# Patient Record
Sex: FEMALE | Race: WHITE | NOT HISPANIC OR LATINO | Employment: PART TIME | URBAN - METROPOLITAN AREA
[De-identification: names, ages, dates, MRNs, and addresses within clinical notes are randomized per-mention and may not be internally consistent; named-entity substitution may affect disease eponyms.]

---

## 2017-08-02 ENCOUNTER — HISTORICAL (OUTPATIENT)
Dept: RADIOLOGY | Facility: HOSPITAL | Age: 27
End: 2017-08-02

## 2021-02-08 LAB
PAP RECOMMENDATION EXT: NORMAL
PAP SMEAR: NORMAL

## 2022-04-11 ENCOUNTER — HISTORICAL (OUTPATIENT)
Dept: ADMINISTRATIVE | Facility: HOSPITAL | Age: 32
End: 2022-04-11

## 2022-04-27 VITALS
HEIGHT: 63 IN | SYSTOLIC BLOOD PRESSURE: 187 MMHG | BODY MASS INDEX: 51.91 KG/M2 | WEIGHT: 293 LBS | DIASTOLIC BLOOD PRESSURE: 113 MMHG

## 2023-02-06 ENCOUNTER — DOCUMENTATION ONLY (OUTPATIENT)
Dept: ADMINISTRATIVE | Facility: HOSPITAL | Age: 33
End: 2023-02-06

## 2023-03-24 DIAGNOSIS — M54.30 SCIATICA, UNSPECIFIED LATERALITY: Primary | ICD-10-CM

## 2023-03-24 DIAGNOSIS — R52 WHOLE BODY PAIN: ICD-10-CM

## 2023-08-29 DIAGNOSIS — R51.9 FREQUENT HEADACHES: Primary | ICD-10-CM

## 2023-11-09 ENCOUNTER — HOSPITAL ENCOUNTER (OUTPATIENT)
Dept: RADIOLOGY | Facility: HOSPITAL | Age: 33
Discharge: HOME | End: 2023-11-09
Attending: INTERNAL MEDICINE
Payer: MEDICAID

## 2023-11-09 ENCOUNTER — OFFICE VISIT (OUTPATIENT)
Dept: RHEUMATOLOGY | Facility: CLINIC | Age: 33
End: 2023-11-09
Payer: MEDICAID

## 2023-11-09 VITALS
WEIGHT: 293 LBS | BODY MASS INDEX: 53.92 KG/M2 | TEMPERATURE: 98 F | SYSTOLIC BLOOD PRESSURE: 160 MMHG | HEART RATE: 93 BPM | HEIGHT: 62 IN | DIASTOLIC BLOOD PRESSURE: 86 MMHG | RESPIRATION RATE: 20 BRPM | OXYGEN SATURATION: 98 %

## 2023-11-09 DIAGNOSIS — M79.671 CHRONIC PAIN OF BOTH FEET: ICD-10-CM

## 2023-11-09 DIAGNOSIS — I10 PRIMARY HYPERTENSION: ICD-10-CM

## 2023-11-09 DIAGNOSIS — M79.7 FIBROMYALGIA: Primary | ICD-10-CM

## 2023-11-09 DIAGNOSIS — M25.511 CHRONIC PAIN OF BOTH SHOULDERS: ICD-10-CM

## 2023-11-09 DIAGNOSIS — M25.512 CHRONIC PAIN OF BOTH SHOULDERS: ICD-10-CM

## 2023-11-09 DIAGNOSIS — G89.29 CHRONIC PAIN OF BOTH FEET: ICD-10-CM

## 2023-11-09 DIAGNOSIS — M79.641 PAIN OF RIGHT HAND: ICD-10-CM

## 2023-11-09 DIAGNOSIS — G89.29 CHRONIC PAIN OF BOTH SHOULDERS: ICD-10-CM

## 2023-11-09 DIAGNOSIS — M79.672 CHRONIC PAIN OF BOTH FEET: ICD-10-CM

## 2023-11-09 DIAGNOSIS — G56.01 CARPAL TUNNEL SYNDROME OF RIGHT WRIST: ICD-10-CM

## 2023-11-09 DIAGNOSIS — G43.919 INTRACTABLE MIGRAINE WITHOUT STATUS MIGRAINOSUS, UNSPECIFIED MIGRAINE TYPE: ICD-10-CM

## 2023-11-09 DIAGNOSIS — F17.200 SMOKING: ICD-10-CM

## 2023-11-09 DIAGNOSIS — M79.7 FIBROMYALGIA: ICD-10-CM

## 2023-11-09 DIAGNOSIS — J45.909 ASTHMA, UNSPECIFIED ASTHMA SEVERITY, UNSPECIFIED WHETHER COMPLICATED, UNSPECIFIED WHETHER PERSISTENT: ICD-10-CM

## 2023-11-09 DIAGNOSIS — K44.9 HIATAL HERNIA: ICD-10-CM

## 2023-11-09 DIAGNOSIS — E66.01 CLASS 3 SEVERE OBESITY DUE TO EXCESS CALORIES WITH BODY MASS INDEX (BMI) OF 60.0 TO 69.9 IN ADULT, UNSPECIFIED WHETHER SERIOUS COMORBIDITY PRESENT: ICD-10-CM

## 2023-11-09 DIAGNOSIS — G62.9 NEUROPATHY: ICD-10-CM

## 2023-11-09 DIAGNOSIS — M54.30 SCIATICA, UNSPECIFIED LATERALITY: ICD-10-CM

## 2023-11-09 PROBLEM — E66.813 CLASS 3 SEVERE OBESITY DUE TO EXCESS CALORIES WITH BODY MASS INDEX (BMI) OF 60.0 TO 69.9 IN ADULT: Status: ACTIVE | Noted: 2023-11-09

## 2023-11-09 PROCEDURE — 73130 X-RAY EXAM OF HAND: CPT | Mod: TC,LT

## 2023-11-09 PROCEDURE — 3008F BODY MASS INDEX DOCD: CPT | Mod: CPTII,,, | Performed by: INTERNAL MEDICINE

## 2023-11-09 PROCEDURE — 4010F ACE/ARB THERAPY RXD/TAKEN: CPT | Mod: CPTII,,, | Performed by: INTERNAL MEDICINE

## 2023-11-09 PROCEDURE — 73630 X-RAY EXAM OF FOOT: CPT | Mod: TC,RT

## 2023-11-09 PROCEDURE — 3079F DIAST BP 80-89 MM HG: CPT | Mod: CPTII,,, | Performed by: INTERNAL MEDICINE

## 2023-11-09 PROCEDURE — 72114 X-RAY EXAM L-S SPINE BENDING: CPT | Mod: TC

## 2023-11-09 PROCEDURE — 99205 PR OFFICE/OUTPT VISIT, NEW, LEVL V, 60-74 MIN: ICD-10-PCS | Mod: S$PBB,,, | Performed by: INTERNAL MEDICINE

## 2023-11-09 PROCEDURE — 73630 X-RAY EXAM OF FOOT: CPT | Mod: TC,LT

## 2023-11-09 PROCEDURE — 3008F PR BODY MASS INDEX (BMI) DOCUMENTED: ICD-10-PCS | Mod: CPTII,,, | Performed by: INTERNAL MEDICINE

## 2023-11-09 PROCEDURE — 99205 OFFICE O/P NEW HI 60 MIN: CPT | Mod: S$PBB,,, | Performed by: INTERNAL MEDICINE

## 2023-11-09 PROCEDURE — 73030 X-RAY EXAM OF SHOULDER: CPT | Mod: TC,RT

## 2023-11-09 PROCEDURE — 73130 X-RAY EXAM OF HAND: CPT | Mod: TC,RT

## 2023-11-09 PROCEDURE — 3077F PR MOST RECENT SYSTOLIC BLOOD PRESSURE >= 140 MM HG: ICD-10-PCS | Mod: CPTII,,, | Performed by: INTERNAL MEDICINE

## 2023-11-09 PROCEDURE — 99215 OFFICE O/P EST HI 40 MIN: CPT | Mod: PBBFAC | Performed by: INTERNAL MEDICINE

## 2023-11-09 PROCEDURE — 73030 X-RAY EXAM OF SHOULDER: CPT | Mod: TC,LT

## 2023-11-09 PROCEDURE — 3077F SYST BP >= 140 MM HG: CPT | Mod: CPTII,,, | Performed by: INTERNAL MEDICINE

## 2023-11-09 PROCEDURE — 1159F PR MEDICATION LIST DOCUMENTED IN MEDICAL RECORD: ICD-10-PCS | Mod: CPTII,,, | Performed by: INTERNAL MEDICINE

## 2023-11-09 PROCEDURE — 3079F PR MOST RECENT DIASTOLIC BLOOD PRESSURE 80-89 MM HG: ICD-10-PCS | Mod: CPTII,,, | Performed by: INTERNAL MEDICINE

## 2023-11-09 PROCEDURE — 4010F PR ACE/ARB THEARPY RXD/TAKEN: ICD-10-PCS | Mod: CPTII,,, | Performed by: INTERNAL MEDICINE

## 2023-11-09 PROCEDURE — 1159F MED LIST DOCD IN RCRD: CPT | Mod: CPTII,,, | Performed by: INTERNAL MEDICINE

## 2023-11-09 RX ORDER — TIZANIDINE 4 MG/1
4 TABLET ORAL NIGHTLY
COMMUNITY
Start: 2023-10-16

## 2023-11-09 RX ORDER — LEVOCETIRIZINE DIHYDROCHLORIDE 5 MG/1
5 TABLET, FILM COATED ORAL DAILY
COMMUNITY
Start: 2023-10-16

## 2023-11-09 RX ORDER — IBUPROFEN 800 MG/1
800-1600 TABLET ORAL
COMMUNITY
Start: 2023-10-18

## 2023-11-09 RX ORDER — ASPIRIN 325 MG
50000 TABLET, DELAYED RELEASE (ENTERIC COATED) ORAL
COMMUNITY
Start: 2023-10-16

## 2023-11-09 RX ORDER — BUTALBITAL, ACETAMINOPHEN AND CAFFEINE 300; 40; 50 MG/1; MG/1; MG/1
1 CAPSULE ORAL
COMMUNITY
Start: 2023-08-04 | End: 2024-02-08

## 2023-11-09 RX ORDER — VALACYCLOVIR HYDROCHLORIDE 1 G/1
1000 TABLET, FILM COATED ORAL 2 TIMES DAILY PRN
COMMUNITY
Start: 2023-11-07

## 2023-11-09 RX ORDER — SUMATRIPTAN SUCCINATE 25 MG/1
25 TABLET ORAL DAILY PRN
COMMUNITY
Start: 2023-10-05 | End: 2024-02-08

## 2023-11-09 RX ORDER — ENALAPRIL MALEATE AND HYDROCHLOROTHIAZIDE 10; 25 MG/1; MG/1
1 TABLET ORAL DAILY
COMMUNITY
Start: 2023-10-16

## 2023-11-09 RX ORDER — LISINOPRIL 10 MG/1
10 TABLET ORAL DAILY
COMMUNITY
Start: 2023-11-06

## 2023-11-09 RX ORDER — GABAPENTIN 300 MG/1
300 CAPSULE ORAL 3 TIMES DAILY
COMMUNITY
Start: 2023-10-24

## 2023-11-09 RX ORDER — OXYBUTYNIN CHLORIDE 5 MG/1
5 TABLET ORAL DAILY
COMMUNITY
Start: 2023-10-16

## 2023-11-09 RX ORDER — HYDRALAZINE HYDROCHLORIDE 25 MG/1
25 TABLET, FILM COATED ORAL EVERY 8 HOURS PRN
COMMUNITY
Start: 2023-10-16

## 2023-11-09 RX ORDER — HYDROCHLOROTHIAZIDE 25 MG/1
25 TABLET ORAL DAILY
COMMUNITY
Start: 2023-11-06

## 2023-11-09 RX ORDER — HYDROXYCHLOROQUINE SULFATE 200 MG/1
200 TABLET, FILM COATED ORAL 2 TIMES DAILY
COMMUNITY
Start: 2023-10-16 | End: 2023-11-14 | Stop reason: ALTCHOICE

## 2023-11-09 RX ORDER — NORGESTIMATE AND ETHINYL ESTRADIOL 7DAYSX3 LO
1 KIT ORAL DAILY
COMMUNITY
Start: 2023-10-16

## 2023-11-09 RX ORDER — DILTIAZEM HYDROCHLORIDE 240 MG/1
240 CAPSULE, EXTENDED RELEASE ORAL DAILY
COMMUNITY
Start: 2023-10-28

## 2023-11-09 RX ORDER — TRAZODONE HYDROCHLORIDE 50 MG/1
50 TABLET ORAL NIGHTLY
COMMUNITY
Start: 2023-10-16

## 2023-11-09 NOTE — PROGRESS NOTES
Patient ID: [id number]     Chief Complaint: Pain (Right hip, right side back, both shoulders, right hand, right knee, left foot. States shocking pain. Right side of body gets stiff and causes the pain to worsen.)      Referred By: Paper Order     HPI:     Mackenzie Church is a 33 y.o. female here today for a new patient visit.      Patient was previously being seen by Dr. Braxton as her PCP but switched providers. She reports having history of pain primarily from her right shoulder down to her knees on the right side of her body. She also reports having pain in her left shoulder and left foot. She was previously prescribed Plaquenil 200 mg BID which she has been taking for over a year though she has not noted any relief with treatment. Patient does reports history of Raynaud's phenomenon. She reports previous extensive workup of her symptoms but was not given a definitive diagnosis. Of note, patient does report history of poor sleep/insomnia with difficulty falling asleep. Also reports not feeling well rested when she awakens from sleep even if she get 7-8 hours of rest. Patient does report working on trying to improve her health and has started going to the gym with reported 140 lb weight loss.     She saw Dr Braxton in the past. Now seeing Ms Cirilo Lawson.       Denies history of fevers, rashes, photosensitivity, oral or nasal ulcers, h/o MI, stroke, seizures, h/o PE or DVT, uveitis, malignancies.     Family history of autoimmune disease: Lupus in her sister and mother; Fibromyalgia in maternal aunts      Smoking:   Social History     Tobacco Use   Smoking Status Every Day    Current packs/day: 0.50    Average packs/day: 0.5 packs/day for 20.0 years (10.0 ttl pk-yrs)    Types: Cigarettes   Smokeless Tobacco Never   Tobacco Comments    Working on quitting, went from 2 packs daily to 1/2 pack daily          ----------------------------  Carpal tunnel syndrome  Endometriosis, unspecified  Hiatal hernia  HTN  (hypertension)  Legal blindness  Migraine  Neuropathy  PCOS (polycystic ovarian syndrome)  Sciatica     Past Surgical History:   Procedure Laterality Date    REATTACHMENT OF FINGER Right     second and third digits    TONSILLECTOMY, ADENOIDECTOMY         Review of patient's allergies indicates:   Allergen Reactions    Robaxin [methocarbamol] Itching and Nausea And Vomiting       Outpatient Medications Marked as Taking for the 11/9/23 encounter (Office Visit) with Jl Damon MD   Medication Sig Dispense Refill    butalbital-acetaminophen-caff -40 mg Cap Take 1 capsule by mouth every 6 to 8 hours as needed.      cholecalciferol, vitamin D3, 1,250 mcg (50,000 unit) capsule Take 50,000 Units by mouth every 7 days.      enalapriL-hydrochlorothiazide (VASERETIC) 10-25 mg per tablet Take 1 tablet by mouth once daily.      gabapentin (NEURONTIN) 300 MG capsule Take 300 mg by mouth 3 (three) times daily.      hydrALAZINE (APRESOLINE) 25 MG tablet Take 25 mg by mouth every 8 (eight) hours as needed.      hydroCHLOROthiazide (HYDRODIURIL) 25 MG tablet Take 25 mg by mouth once daily.      hydroxychloroquine (PLAQUENIL) 200 mg tablet Take 200 mg by mouth 2 (two) times daily.      ibuprofen (ADVIL,MOTRIN) 800 MG tablet Take 800-1,600 mg by mouth every 4 to 6 hours as needed.      levocetirizine (XYZAL) 5 MG tablet Take 5 mg by mouth once daily.      lisinopriL 10 MG tablet Take 10 mg by mouth once daily.      oxybutynin (DITROPAN) 5 MG Tab Take 5 mg by mouth once daily.      sumatriptan (IMITREX) 25 MG Tab Take 25 mg by mouth daily as needed.      TAZTIA  mg Cs24 Take 240 mg by mouth once daily.      tiZANidine (ZANAFLEX) 4 MG tablet Take 4 mg by mouth every evening.      traZODone (DESYREL) 50 MG tablet Take 50 mg by mouth every evening.      TRI-LO-ESTARYLLA 0.18/0.215/0.25 mg-25 mcg tablet Take 1 tablet by mouth once daily.      valACYclovir (VALTREX) 1000 MG tablet Take 1,000 mg by mouth 2 (two) times daily  as needed.         Social History     Socioeconomic History    Marital status: Single   Tobacco Use    Smoking status: Every Day     Current packs/day: 0.50     Average packs/day: 0.5 packs/day for 20.0 years (10.0 ttl pk-yrs)     Types: Cigarettes    Smokeless tobacco: Never    Tobacco comments:     Working on quitting, went from 2 packs daily to 1/2 pack daily   Substance and Sexual Activity    Alcohol use: Not Currently     Comment: occ    Drug use: Never        Family History   Problem Relation Age of Onset    Fibromyalgia Mother     Lupus Mother     HIV Mother     Bipolar disorder Mother     Lung cancer Father     Lupus Sister     Graves' disease Brother     Prostate cancer Paternal Uncle     Stomach cancer Paternal Grandmother         Immunization History   Administered Date(s) Administered    Tdap 02/23/2021       Patient Care Team:  Sue Cantu FNP as PCP - General (Family Medicine)     Subjective:     Constitutional:  Denies chills. Denies fever. Denies night sweats. Denies weight loss.   Ophthalmology: Denies blurred vision. Denies dry eyes. Denies eye pain. Denies Itching and redness.   ENT: Denies oral ulcers. Denies epistaxis. Denies dry mouth. Denies swollen glands.   Endocrine: Denies diabetes. Denies thyroid Problems.   Respiratory: Denies cough. Denies shortness of breath. Denies shortness of breath with exertion. Denies hemoptysis.   Cardiovascular: Denies chest pain at rest. Denies chest pain with exertion. Denies palpitations.    Gastrointestinal: Denies abdominal pain. Denies diarrhea. Denies nausea. Denies vomiting. Denies hematemesis or hematochezia. Denies heartburn.  Genitourinary: Denies blood in urine.  Musculoskeletal: See HPI for details  Integumentary: Denies rash. Denies photosensitivity.   Peripheral Vascular: Denies Ulcers of hands and/or feet. Denies Cold extremities.   Neurologic: Denies dizziness. Denies headache.  Denies loss of strength. Denies numbness or tingling.  "  Psychiatric: Denies depression. Denies anxiety. Denies suicidal/homicidal ideations.      Objective:     BP (!) 160/86 (BP Location: Left forearm, Patient Position: Sitting, BP Method: Medium (Manual))   Pulse 93   Temp 97.9 °F (36.6 °C) (Oral)   Resp 20   Ht 5' 2" (1.575 m)   Wt (!) 158.8 kg (350 lb 3.2 oz)   LMP 10/05/2023 (Exact Date)   SpO2 98%   BMI 64.05 kg/m²     Physical Exam    General Appearance: alert, pleasant, in no acute distress.  Skin: Skin color, texture, turgor normal. Small oral lesion noted in right upper lip. no lesions inside oral cavity  Eyes:  extraocular movement intact (EOMI), pupils equal, round, reactive to light and accommodation, conjunctiva clear.  ENT: No oral or nasal ulcers.  Neck:  Neck supple. No adenopathy.   Lungs: CTA throughout without crackles, rhonchi, or wheezes.   Heart: RRR w/o murmurs.  No edema. 2+ DP/TP pulse. Normal capillary refill  Abdomen: Soft, non-tender, no masses, rebound or guarding.  Neuro: Alert, oriented, CN II-XII GI, sensory and motor innervation intact.  Musculoskeletal: Tender points noted diffusely/bilaterally   Psych: Alert, oriented, normal eye contact.    Joint Exam:  DIPs, PIPs, MCPs: pain and swelling noted in 2nd MCP joint of right hand, no tenderness or swelling of other MCP's   Wrists: no pain on palpation, no swelling or redness, good range of motion.  Elbows: no pain on palpation, no swelling or redness, good range of motion, no nodules.  Shoulders: no pain on palpation, no swelling or redness, good range of motion.  Back/Neck: no pain on palpation.   Hips: unable to examine due to body habitus  Knees: unable to examine due to body habitus  Ankles: no pain on palpation, no swelling or redness, good range of motion.  Feet: no pain on palpation, no swelling or redness, no nodules. Flat feet noted bilaterally    Labs:   Unable to access previous records    Imaging:   Unable to access previous records    Assessment:       ICD-10-CM " ICD-9-CM   1. Fibromyalgia  M79.7 729.1   2. Chronic pain of both feet  M79.671 729.5    G89.29 338.29    M79.672    3. Chronic pain of both shoulders  M25.511 719.41    G89.29 338.29    M25.512    4. Pain of right hand  M79.641 729.5   5. Primary hypertension  I10 401.9   6. Sciatica, unspecified laterality  M54.30 724.3   7. Class 3 severe obesity due to excess calories with body mass index (BMI) of 60.0 to 69.9 in adult, unspecified whether serious comorbidity present  E66.01 278.01    Z68.44 V85.44   8. Intractable migraine without status migrainosus, unspecified migraine type  G43.919 346.91   9. Asthma, unspecified asthma severity, unspecified whether complicated, unspecified whether persistent  J45.909 493.90   10. Hiatal hernia  K44.9 553.3   11. Carpal tunnel syndrome of right wrist  G56.01 354.0   12. Neuropathy  G62.9 355.9   13. Smoking  F17.200 305.1        Plan:     Fibromyalgia:  Discussed clinical features of fibromyalgia in detail.  Fibromyalgia is a chronic pain syndrome.  Underlying causes of fibromyalgia may be numerous.  An underlying nonrestorative sleep pattern, mental and physical stressors play a role in pain perception.    Discussed relationship of pain with sleep.  The brain functions best with a normal restful sleep that includes REM sleep.  Discussed that a nonrestorative sleep pattern may cause a decrease in pain tolerance.  Discussed that over time, this builds up and causes a cycling effect on pain.  This pain is not easily curable with pain medications or narcotics.  It is important to decrease stress levels and improving sleep patterns/hygiene.  A restorative sleep pattern is important in improving the perception of pain.    Medications for fibromyalgia only help 10-20% of the time and come with multiple side effects. FDA recommended medications for fibromyalgia include: lyrica, cymbalta, and savella.  Other medications which have been used in fibromyalgia include amitriptyline,  gabapentin, flexeril, gabapentin, and low dose naltresone.     Exercise and a healthy life-style is important in management of this syndrome. We discussed this at length and I have recommended regular low impact exercise, preferably aquatic therapy, as well as cognitive/ behavioral therapy.    - Recommend having sleep study done.   - Will order screening labs today to assess; CBC, CMP, RANDAL, CCP, RF   - Will order X-rays of hands, wrists, shoulders, feet, and lumbar spine   - Currently taking Plaquenil 200 mg BID; will make changes once tests/imaging return   - Discussed about watching her diet and weight loss.  - Flat feet bilaterally, body habitus and flat feet contributing to ankle pain.     Suspect component of JOSE   - Recommend patient talk to PCP about scheduling sleep study as component of patient's symptoms    Raynaud's Phenomenon   - Previously diagnosed with Raynaud's by previous PCP. Discussed symptomatic management.    - Asked patient to take images of skin color changes of her hands and bring to next appointment    Tobacco use   - Reports previously smoking 2 ppd and recently cut down to 0.5 ppd   - Encourage continued smoking cessation       Follow up in about 3 months (around 2/9/2024). In addition to their scheduled follow up, the patient has also been instructed to follow up on as needed basis.        Total time spent with patient and documentation is more than 60 minutes. All questions were answered to patient's satisfaction and patient verbalized understanding.     Feliciano Manuel MD  \[facility]\"" Internal Medicine, HO-2    Attending addendum to follow with appropriate changes made to documentation.

## 2023-11-14 ENCOUNTER — TELEPHONE (OUTPATIENT)
Dept: RHEUMATOLOGY | Facility: CLINIC | Age: 33
End: 2023-11-14
Payer: MEDICAID

## 2023-11-14 NOTE — TELEPHONE ENCOUNTER
Please let her know blood work for rheumatoid arthritis and lupus came back negative.    X-ray showed arthritis in spine, bilateral feet and bilateral shoulders.  Continue with stretches and exercises.  She can stop taking Plaquenil.  Recommend continuing exercises and going to the gym, continue to watch her weight and weight loss.  Recommend doing physical therapy to help with pain in bilateral shoulders and back.

## 2024-01-30 PROBLEM — F17.210 CIGARETTE NICOTINE DEPENDENCE WITHOUT COMPLICATION: Status: ACTIVE | Noted: 2024-01-30

## 2024-01-30 PROBLEM — M79.7 FIBROMYALGIA: Status: ACTIVE | Noted: 2024-01-30

## 2024-01-30 PROBLEM — I73.00 RAYNAUD'S PHENOMENON WITHOUT GANGRENE: Status: ACTIVE | Noted: 2024-01-30

## 2024-02-08 ENCOUNTER — OFFICE VISIT (OUTPATIENT)
Dept: NEUROLOGY | Facility: CLINIC | Age: 34
End: 2024-02-08
Payer: MEDICAID

## 2024-02-08 VITALS
SYSTOLIC BLOOD PRESSURE: 183 MMHG | BODY MASS INDEX: 53.92 KG/M2 | HEART RATE: 88 BPM | WEIGHT: 293 LBS | DIASTOLIC BLOOD PRESSURE: 84 MMHG | HEIGHT: 62 IN | OXYGEN SATURATION: 99 %

## 2024-02-08 DIAGNOSIS — R11.0 NAUSEA: ICD-10-CM

## 2024-02-08 DIAGNOSIS — R40.0 DAYTIME SOMNOLENCE: ICD-10-CM

## 2024-02-08 DIAGNOSIS — R51.9 FREQUENT HEADACHES: ICD-10-CM

## 2024-02-08 DIAGNOSIS — G43.111 MIGRAINE WITH AURA, INTRACTABLE, WITH STATUS MIGRAINOSUS: Primary | ICD-10-CM

## 2024-02-08 DIAGNOSIS — R06.83 SNORING: ICD-10-CM

## 2024-02-08 PROCEDURE — 99215 OFFICE O/P EST HI 40 MIN: CPT | Mod: PBBFAC | Performed by: NURSE PRACTITIONER

## 2024-02-08 PROCEDURE — 99215 OFFICE O/P EST HI 40 MIN: CPT | Mod: FQ,S$PBB,, | Performed by: NURSE PRACTITIONER

## 2024-02-08 PROCEDURE — 96372 THER/PROPH/DIAG INJ SC/IM: CPT | Mod: PBBFAC

## 2024-02-08 PROCEDURE — G2211 COMPLEX E/M VISIT ADD ON: HCPCS | Mod: S$PBB,,, | Performed by: NURSE PRACTITIONER

## 2024-02-08 PROCEDURE — 3079F DIAST BP 80-89 MM HG: CPT | Mod: CPTII,,, | Performed by: NURSE PRACTITIONER

## 2024-02-08 PROCEDURE — 3008F BODY MASS INDEX DOCD: CPT | Mod: CPTII,,, | Performed by: NURSE PRACTITIONER

## 2024-02-08 PROCEDURE — 4010F ACE/ARB THERAPY RXD/TAKEN: CPT | Mod: CPTII,,, | Performed by: NURSE PRACTITIONER

## 2024-02-08 PROCEDURE — 1159F MED LIST DOCD IN RCRD: CPT | Mod: CPTII,,, | Performed by: NURSE PRACTITIONER

## 2024-02-08 PROCEDURE — 1160F RVW MEDS BY RX/DR IN RCRD: CPT | Mod: CPTII,,, | Performed by: NURSE PRACTITIONER

## 2024-02-08 PROCEDURE — 3077F SYST BP >= 140 MM HG: CPT | Mod: CPTII,,, | Performed by: NURSE PRACTITIONER

## 2024-02-08 RX ORDER — METOCLOPRAMIDE 10 MG/1
10 TABLET ORAL 2 TIMES DAILY PRN
Qty: 30 TABLET | Refills: 2 | Status: SHIPPED | OUTPATIENT
Start: 2024-02-08

## 2024-02-08 RX ORDER — KETOROLAC TROMETHAMINE 30 MG/ML
30 INJECTION, SOLUTION INTRAMUSCULAR; INTRAVENOUS
Status: COMPLETED | OUTPATIENT
Start: 2024-02-08 | End: 2024-02-08

## 2024-02-08 RX ORDER — LANOLIN ALCOHOL/MO/W.PET/CERES
400 CREAM (GRAM) TOPICAL NIGHTLY
Qty: 30 TABLET | Refills: 6 | Status: SHIPPED | OUTPATIENT
Start: 2024-02-08

## 2024-02-08 RX ORDER — FERROUS SULFATE TAB 325 MG (65 MG ELEMENTAL FE) 325 (65 FE) MG
325 TAB ORAL DAILY
COMMUNITY
Start: 2024-01-13

## 2024-02-08 RX ADMIN — KETOROLAC TROMETHAMINE 30 MG: 30 INJECTION, SOLUTION INTRAMUSCULAR; INTRAVENOUS at 02:02
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
## 2024-02-09 ENCOUNTER — TELEPHONE (OUTPATIENT)
Dept: NEUROLOGY | Facility: CLINIC | Age: 34
End: 2024-02-09
Payer: MEDICAID

## 2024-02-09 NOTE — TELEPHONE ENCOUNTER
----- Message from KEILY Ludwig sent at 2/9/2024 11:44 AM CST -----  I sent in a prescription for Ajovy once a month. Her local pharmacy may not carry it. Please contact Pt and let her know. If her pharmacy does not carry it, please call it in to [facility], [facility] or [facility] of patient's choice.  ----- Message -----  From: Shae Umanzor MA  Sent: 2/9/2024  11:03 AM CST  To: KEILY Ludwig    Qulipta denied. Must try and fail formulary alternatives:    AJOVY AUTOINJECTOR 225 MG/1.5 AUTO INJCT   AJOVY SYRINGE 225 MG/1.5 SYRINGE   EMGALITY  MG/ML PEN INJCTR   EMGALITY SYRINGE 120 MG/ML SYRINGE   NURTEC ODT 75 MG TAB RAPDIS   UBRELVY 100 MG TABLET   UBRELVY 50 MG TABLET

## 2024-04-23 DIAGNOSIS — G43.111 MIGRAINE WITH AURA, INTRACTABLE, WITH STATUS MIGRAINOSUS: ICD-10-CM

## 2024-04-24 RX ORDER — UBROGEPANT 100 MG/1
TABLET ORAL
Qty: 10 TABLET | Refills: 2 | Status: SHIPPED | OUTPATIENT
Start: 2024-04-24 | End: 2024-05-14 | Stop reason: SDUPTHER

## 2024-05-09 NOTE — PROGRESS NOTES
Patient ID: [id number]     Chief Complaint: Fibromyalgia (Pt stated having pratima hip, rt knee and rt hand pain .Stated also rt hand numbness lost of feeling in legs)      HPI:     Mackenzie Church is a 33 y.o. female here today for a follow up visit.      Patient was previously being seen by Dr. Braxton as her PCP but switched providers. She reports having history of pain primarily from her right shoulder down to her knees on the right side of her body. She also reports having pain in her left shoulder and left foot. She was previously prescribed Plaquenil 200 mg BID which she has been taking for over a year though she has not noted any relief with treatment. Patient does reports history of Raynaud's phenomenon. She reports previous extensive workup of her symptoms but was not given a definitive diagnosis. Of note, patient does report history of poor sleep/insomnia with difficulty falling asleep. Also reports not feeling well rested when she awakens from sleep even if she get 7-8 hours of rest. Patient does report working on trying to improve her health and has started going to the gym with reported 140 lb weight loss.     She saw Dr Braxton in the past. Now seeing Ms Kerr EVE Lawson     May 2024: Here today for follow up. Since her last visit she was tested for sleep apnea and diagnosed with JOSE- she was started on CPAP but is having issues with the mask, waiting a call back from [facility]. She continues to work out 3-4 times a week at the gym, walking at least 30 miles a week- she is also on her feet and moving all day at work, works as a  at [employer]. She reports having red/warm/swollen joints to her hands and right knee. She reports her hands have really been bothering her, she states her Raynaud's has been bothering her, reports worse with cold, always trying to keep her hands warm, hands going numb at times- also suffers with CTS on right, uses night splints and does help, has had injections in the past  and was also beneficial but has not had in some time. She does report small sores on her finger tips that will last a week some days. She also reports she has been small blisters on her fingers now on her feet and she also reports happening in her hairline. She reports blisters are very painful. She has been feeling bad for the past year but feels symptoms are worsening. She also reports muscle weakness, she states she has been dropping objects, also occurring in her legs as well, initially thinking it was due to sciatica, she reports she has had falls in the past, feels weak and falls, she reports a tingling sensation then legs will give out.  She states weakness as been present for sometime. She will get occ red spots mainly to her forearms, otherwise no rashes, no itching/burning/etc, thought possibly eczema- comes and goes. She suffers with Migraines- recently had her meds adjusted and reports much better but still has occ headaches, currently has one today- follows with Neurology.     Denies any recent illness or hospitalizations since last visit.     Dr. Rodriguez- Cardio  Dr. Moore/Chayo Butt, NP- Neurology     Denies history of fevers, rashes, photosensitivity, oral or nasal ulcers, h/o MI, stroke, seizures, h/o PE or DVT, uveitis, malignancies.   Family history of autoimmune disease: Lupus in her sister and mother; Fibromyalgia in maternal aunts  Smoking: Start age 11 years, 1/2 ppd, hx 2 ppd x 10+ years, working on quitting- congratulated on efforts.     Social History     Tobacco Use   Smoking Status Former    Current packs/day: 0.50    Average packs/day: 0.5 packs/day for 20.0 years (10.0 ttl pk-yrs)    Types: Cigarettes   Smokeless Tobacco Never   Tobacco Comments    Working on quitting, went from 2 packs daily to 1/2 pack daily          -------------------------------------    Carpal tunnel syndrome    Endometriosis, unspecified    Hiatal hernia    HTN (hypertension)    Legal blindness    Migraine     Neuropathy    PCOS (polycystic ovarian syndrome)    Sciatica        Past Surgical History:   Procedure Laterality Date    REATTACHMENT OF FINGER Right     second and third digits    TONSILLECTOMY, ADENOIDECTOMY         Review of patient's allergies indicates:   Allergen Reactions    Robaxin [methocarbamol] Itching and Nausea And Vomiting       Outpatient Medications Marked as Taking for the 5/13/24 encounter (Office Visit) with Alexandria Tamayo FNP   Medication Sig Dispense Refill    cholecalciferol, vitamin D3, 1,250 mcg (50,000 unit) capsule Take 50,000 Units by mouth every 7 days.      FEROSUL 325 mg (65 mg iron) Tab tablet Take 325 mg by mouth once daily.      gabapentin (NEURONTIN) 300 MG capsule Take 300 mg by mouth 3 (three) times daily.      hydrALAZINE (APRESOLINE) 25 MG tablet Take 25 mg by mouth every 8 (eight) hours as needed.      levocetirizine (XYZAL) 5 MG tablet Take 5 mg by mouth once daily.      lisinopriL 10 MG tablet Take 10 mg by mouth once daily.      magnesium oxide (MAG-OX) 400 mg (241.3 mg magnesium) tablet Take 1 tablet (400 mg total) by mouth every evening. 30 tablet 6    metoclopramide HCl (REGLAN) 10 MG tablet Take 1 tablet (10 mg total) by mouth 2 (two) times daily as needed (nausea). 30 tablet 2    oxybutynin (DITROPAN) 5 MG Tab Take 5 mg by mouth once daily.      TAZTIA  mg Cs24 Take 240 mg by mouth once daily.      tiZANidine (ZANAFLEX) 4 MG tablet Take 4 mg by mouth every evening. Takes PRN      traZODone (DESYREL) 50 MG tablet Take 50 mg by mouth every evening.      TRI-LO-ESTARYLLA 0.18/0.215/0.25 mg-25 mcg tablet Take 1 tablet by mouth once daily.      UBRELVY 100 mg tablet TAKE ONE TABLET BY MOUTH TWICE DAILY AS NEEDED FOR MIGRAINE. IF symptoms persist OR return, may repeat dose AFTER TWO hours. max OF 200mg per 24 HOURS 10 tablet 2    valACYclovir (VALTREX) 1000 MG tablet Take 1,000 mg by mouth 2 (two) times daily as needed.       Current Facility-Administered  "Medications for the 5/13/24 encounter (Office Visit) with Alexandria Tamayo FNP   Medication Dose Route Frequency Provider Last Rate Last Admin    fremanezumab-vfrm 225 mg/1.5 mL autoinjector 225 mg  225 mg Subcutaneous 1 time in Clinic/[facility] Chayo Butt ANP           Social History     Socioeconomic History    Marital status: Single   Tobacco Use    Smoking status: Former     Current packs/day: 0.50     Average packs/day: 0.5 packs/day for 20.0 years (10.0 ttl pk-yrs)     Types: Cigarettes    Smokeless tobacco: Never    Tobacco comments:     Working on quitting, went from 2 packs daily to 1/2 pack daily   Substance and Sexual Activity    Alcohol use: Not Currently     Comment: occ    Drug use: Never    Sexual activity: Not Currently        Family History   Problem Relation Name Age of Onset    Fibromyalgia Mother      Lupus Mother      HIV Mother      Bipolar disorder Mother      Lung cancer Father      Lupus Sister      Graves' disease Brother      Prostate cancer Paternal Uncle      Stomach cancer Paternal Grandmother          Immunization History   Administered Date(s) Administered    Tdap 02/23/2021       Patient Care Team:  Sue Cantu FNP as PCP - General (Family Medicine)     Subjective:     Constitutional:  Denies chills. Denies fever. Denies night sweats. Admits weight loss, #5 since last visit- working out and dieting  Ophthalmology: Denies blurred vision. Denies dry eyes. Denies eye pain. Denies Itching and redness. Legally blind since the age of 13- states " deterioration" follows [facility]  ENT: Denies oral ulcers. Denies epistaxis. Denies dry mouth. Denies swollen glands.    Endocrine: Denies diabetes. Denies thyroid Problems.   Respiratory: Denies cough. Denies shortness of breath. Denies shortness of breath with exertion. Denies hemoptysis.   Cardiovascular: Denies chest pain at rest. Denies chest pain with exertion. Admits palpitations- stable, follows with " "Cardiology  Gastrointestinal: Denies abdominal pain. Denies diarrhea. Denies nausea. Denies vomiting. Denies hematemesis or hematochezia. Denies heartburn. Admits IBS-D  Genitourinary: Denies blood in urine.  Musculoskeletal: See HPI for details  Integumentary: Denies rash. Denies photosensitivity.   Peripheral Vascular: Denies Ulcers of hands and/or feet. Denies Cold extremities.   Neurologic: Denies dizziness. Admits headache.  Admits loss of strength. Admits numbness or tingling to hands and feet  Psychiatric: Admits depression and anxiety, reports was diagnosed with Bipolar I in the past but has not been on meds for over 15 years, overall has been stable. Denies suicidal/homicidal ideations.      Objective:     BP (!) 147/112 (BP Location: Left arm, Patient Position: Sitting, BP Method: Large (Manual))   Pulse 87   Temp 97.7 °F (36.5 °C) (Oral)   Resp 20   Ht 5' 3" (1.6 m)   Wt (!) 155.2 kg (342 lb 2.5 oz)   SpO2 96%   BMI 60.61 kg/m²     Physical Exam    General Appearance: alert, pleasant, in no acute distress.  Skin: Skin color, texture, turgor normal. No rashes noted  Eyes:  extraocular movement intact (EOMI), pupils equal, round, reactive to light and accommodation, conjunctiva clear.  ENT: No oral or nasal ulcers.  Neck:  Neck supple. No adenopathy.   Lungs: CTA throughout without crackles, rhonchi, or wheezes.   Heart: RRR w/o murmurs.  No edema. 2+ DP pulse. Normal capillary refill  Neuro: Alert, oriented, CN II-XII GI, sensory and motor innervation intact.  Musculoskeletal: Several tender points noted diffusely/bilaterally (12/18), puffy hands bilaterally, no active synovitis noted to bilateral MCPs/PIPs, FROM noted to bilateral wrists, elbows and shoulders with no pain, no pain to bilateral ankles or MTPs, knees with mild crepitus noted, muscle strength noted 5/5 to BU and BLE and neck flexion and extension   Psych: Alert, oriented, normal eye contact.       Labs:   11/9/23: CMP and CBC ok.  RANDAL " negative.  Anti CCP negative.  Rheumatoid factor negative.    Imagin/9/23:  Normal x-ray of bilateral hands.  X-ray of right foot showed punctate density along plantar aspect of interspace between the 1st and 2nd metatarsal may represent foreign body.  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  X-ray of lumbar spine showed DJD changes in posterior elements at L4-L5 and L5-S1, flexion and extension views were obtained with no evidence of instability.  X-ray of bilateral shoulders showed mild narrowing of AC and glenohumeral joints.    Assessment:       ICD-10-CM ICD-9-CM   1. Fibromyalgia  M79.7 729.1   2. Primary hypertension  I10 401.9   3. Cigarette nicotine dependence without complication  F17.210 305.1   4. Raynaud's phenomenon without gangrene  I73.00 443.0   5. Muscle weakness  M62.81 728.87   6. Carpal tunnel syndrome of right wrist  G56.01 354.0   7. Chronic pain of both feet  M79.671 729.5    G89.29 338.29    M79.672    8. Plantar fasciitis  M72.2 728.71   9. Chronic pain of right hip  M25.551 719.45    G89.29 338.29          Plan:     Fibromyalgia  - Discussed clinical features of fibromyalgia in detail again today as she continues to have several tender points noted on exam.  Fibromyalgia is a chronic pain syndrome.  Underlying causes of fibromyalgia may be numerous.  An underlying nonrestorative sleep pattern, mental and physical stressors play a role in pain perception.  - Discussed relationship of pain with sleep.  The brain functions best with a normal restful sleep that includes REM sleep.  Discussed that a nonrestorative sleep pattern may cause a decrease in pain tolerance.  Discussed that over time, this builds up and causes a cycling effect on pain.  This pain is not easily curable with pain medications or narcotics.  It is important to decrease stress levels and improving sleep patterns/hygiene.  A restorative sleep pattern is important in improving the perception of pain. She has  been diagnosed with JOSE since her last visit, awaiting a call back from sleep center for mask adjustment as she is having a difficult time using   - Medications for fibromyalgia only help 10-20% of the time and come with multiple side effects. FDA recommended medications for fibromyalgia include: lyrica, cymbalta, and savella.  Other medications which have been used in fibromyalgia include amitriptyline, gabapentin, flexeril, gabapentin, and low dose naltresone.   - Exercise and a healthy life-style is important in management of this syndrome. We discussed this at length and I have recommended regular low impact exercise, preferably aquatic therapy, as well as cognitive/ behavioral therapy.    - Continue watching  diet and weight loss.    JOSE   - Enc continued use with CPAP    Raynaud's Phenomenon   - Previously diagnosed with Raynaud's by previous PCP. Discussed symptomatic management. Keep hands warm.    - Asked patient to take images of skin color changes of her hands and bring to next appointment- enc to take pictures of sores that she notices on her finger tips as well- not noted on exam today- enc to take pictures and upload to portal     Nicotine Dependence    - Reports previously smoking 2 ppd and recently cut down to 0.5 ppd   - Encourage continued smoking cessation and congratulated her on her efforts as decreasing overall use! Keep working towards completed cessation     HTN  - Currently very elevated today, she reports she has a migraine today- did take meds as directed, continue to follow up with PCP, , encouraged her to monitor at home and if remains elevated follow-up with PCP, enc low Na+ diet.     Muscle weakness   - Strength noted 5/5 today to BU and BLE, will check Aldolase and CPK today for completeness- if continues to have issues with back and sciatica, enc to follow up with PCP for possible advised imaging, possible PT eval and treat     CTS- Right  - Reports previously diagnosed by PCP-  states first 3 digits will get numb and tingle- uses her hands daily as she is a , reports worse at night, she does use night splints and helps, was previously getting injections in the past and were beneficial- will refer to Ortho for eval/possible repeat injections    Chronic bilateral foot pain /plantar fasciitis   - Flat feet bilaterally, body habitus and flat feet contributing to ankle pain- referred to Podiatry today as she also suffers with plantar fasciitis in the past, was previously getting injections but has not had in a while and would like to see Podiatry   - Referral sent over to [facility]- enc to reach out if she does not hear from anyone in the next 2-3 weeks in regards to apt, enc supportive shoes, continue stretches  - November 2023: X-ray of right foot showed punctate density along plantar aspect of interspace between the 1st and 2nd metatarsal may represent foreign body- denies FB that she is aware of.  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     Chronic pain of right hip  - Xray bilateral hips today for completeness, unable to full assess hips due to body habitus  - Enc to continue exercise as tolerated along with diet for continued weight loss.     Follow up in about 6 months (around 11/13/2024) for NP Follow Up. In addition to their scheduled follow up, the patient has also been instructed to follow up on as needed basis.       Total time spent with patient and documentation is 70 minutes. All questions were answered to patient's satisfaction and patient verbalized understanding. This includes face to face time and non-face to face time preparing to see the patient (eg, review of tests), obtaining and/or reviewing separately obtained history, documenting clinical information in the electronic or other health record, independently interpreting results and communicating results to the patient/family/caregiver, or care coordinator. Today's visit included increased complexity  associated with the care of episodic problem Fibromyalgia/Pain addressed and managing the longitudinal care of the patient due to the series and or complex managed problem(s).   An additional 3 minutes were spent discussing tobacco cessation.       MARIA L Valdes

## 2024-05-13 ENCOUNTER — HOSPITAL ENCOUNTER (OUTPATIENT)
Dept: RADIOLOGY | Facility: HOSPITAL | Age: 34
Discharge: HOME | End: 2024-05-13
Attending: NURSE PRACTITIONER
Payer: MEDICAID

## 2024-05-13 ENCOUNTER — OFFICE VISIT (OUTPATIENT)
Dept: RHEUMATOLOGY | Facility: CLINIC | Age: 34
End: 2024-05-13
Payer: MEDICAID

## 2024-05-13 VITALS
HEIGHT: 63 IN | TEMPERATURE: 98 F | OXYGEN SATURATION: 96 % | RESPIRATION RATE: 20 BRPM | HEART RATE: 87 BPM | WEIGHT: 293 LBS | SYSTOLIC BLOOD PRESSURE: 147 MMHG | BODY MASS INDEX: 51.91 KG/M2 | DIASTOLIC BLOOD PRESSURE: 112 MMHG

## 2024-05-13 DIAGNOSIS — I10 PRIMARY HYPERTENSION: ICD-10-CM

## 2024-05-13 DIAGNOSIS — M72.2 PLANTAR FASCIITIS: ICD-10-CM

## 2024-05-13 DIAGNOSIS — M79.672 CHRONIC PAIN OF BOTH FEET: ICD-10-CM

## 2024-05-13 DIAGNOSIS — F17.210 CIGARETTE NICOTINE DEPENDENCE WITHOUT COMPLICATION: ICD-10-CM

## 2024-05-13 DIAGNOSIS — M62.81 MUSCLE WEAKNESS: ICD-10-CM

## 2024-05-13 DIAGNOSIS — G89.29 CHRONIC PAIN OF RIGHT HIP: ICD-10-CM

## 2024-05-13 DIAGNOSIS — I73.00 RAYNAUD'S PHENOMENON WITHOUT GANGRENE: ICD-10-CM

## 2024-05-13 DIAGNOSIS — M25.551 CHRONIC PAIN OF RIGHT HIP: ICD-10-CM

## 2024-05-13 DIAGNOSIS — M79.671 CHRONIC PAIN OF BOTH FEET: ICD-10-CM

## 2024-05-13 DIAGNOSIS — M79.7 FIBROMYALGIA: Primary | ICD-10-CM

## 2024-05-13 DIAGNOSIS — G89.29 CHRONIC PAIN OF BOTH FEET: ICD-10-CM

## 2024-05-13 DIAGNOSIS — G56.01 CARPAL TUNNEL SYNDROME OF RIGHT WRIST: ICD-10-CM

## 2024-05-13 PROBLEM — D64.9 ANEMIA: Status: ACTIVE | Noted: 2024-05-13

## 2024-05-13 PROBLEM — E55.9 VITAMIN D DEFICIENCY: Status: ACTIVE | Noted: 2024-05-13

## 2024-05-13 PROCEDURE — 4010F ACE/ARB THERAPY RXD/TAKEN: CPT | Mod: CPTII,,, | Performed by: NURSE PRACTITIONER

## 2024-05-13 PROCEDURE — 3080F DIAST BP >= 90 MM HG: CPT | Mod: CPTII,,, | Performed by: NURSE PRACTITIONER

## 2024-05-13 PROCEDURE — 1159F MED LIST DOCD IN RCRD: CPT | Mod: CPTII,,, | Performed by: NURSE PRACTITIONER

## 2024-05-13 PROCEDURE — 1160F RVW MEDS BY RX/DR IN RCRD: CPT | Mod: CPTII,,, | Performed by: NURSE PRACTITIONER

## 2024-05-13 PROCEDURE — 3077F SYST BP >= 140 MM HG: CPT | Mod: CPTII,,, | Performed by: NURSE PRACTITIONER

## 2024-05-13 PROCEDURE — 3008F BODY MASS INDEX DOCD: CPT | Mod: CPTII,,, | Performed by: NURSE PRACTITIONER

## 2024-05-13 PROCEDURE — 99215 OFFICE O/P EST HI 40 MIN: CPT | Mod: PBBFAC,25 | Performed by: NURSE PRACTITIONER

## 2024-05-13 PROCEDURE — 99406 BEHAV CHNG SMOKING 3-10 MIN: CPT | Mod: S$PBB,,, | Performed by: NURSE PRACTITIONER

## 2024-05-13 PROCEDURE — 73502 X-RAY EXAM HIP UNI 2-3 VIEWS: CPT | Mod: TC,RT

## 2024-05-13 PROCEDURE — 73502 X-RAY EXAM HIP UNI 2-3 VIEWS: CPT | Mod: TC,LT

## 2024-05-13 PROCEDURE — 99215 OFFICE O/P EST HI 40 MIN: CPT | Mod: S$PBB,25,, | Performed by: NURSE PRACTITIONER

## 2024-05-13 RX ORDER — IBUPROFEN 800 MG/1
800 TABLET ORAL 2 TIMES DAILY
Qty: 60 TABLET | Refills: 1 | Status: SHIPPED | OUTPATIENT
Start: 2024-05-13

## 2024-05-14 ENCOUNTER — PATIENT MESSAGE (OUTPATIENT)
Dept: NEUROLOGY | Facility: CLINIC | Age: 34
End: 2024-05-14

## 2024-05-14 ENCOUNTER — OFFICE VISIT (OUTPATIENT)
Dept: NEUROLOGY | Facility: CLINIC | Age: 34
End: 2024-05-14
Payer: MEDICAID

## 2024-05-14 DIAGNOSIS — G47.33 OSA ON CPAP: ICD-10-CM

## 2024-05-14 DIAGNOSIS — G43.111 MIGRAINE WITH AURA, INTRACTABLE, WITH STATUS MIGRAINOSUS: Primary | ICD-10-CM

## 2024-05-14 DIAGNOSIS — E66.01 CLASS 3 SEVERE OBESITY DUE TO EXCESS CALORIES WITH BODY MASS INDEX (BMI) OF 60.0 TO 69.9 IN ADULT, UNSPECIFIED WHETHER SERIOUS COMORBIDITY PRESENT: ICD-10-CM

## 2024-05-14 PROCEDURE — 99214 OFFICE O/P EST MOD 30 MIN: CPT | Mod: 95,,, | Performed by: NURSE PRACTITIONER

## 2024-05-14 PROCEDURE — 1160F RVW MEDS BY RX/DR IN RCRD: CPT | Mod: CPTII,95,, | Performed by: NURSE PRACTITIONER

## 2024-05-14 PROCEDURE — 1159F MED LIST DOCD IN RCRD: CPT | Mod: CPTII,95,, | Performed by: NURSE PRACTITIONER

## 2024-05-14 PROCEDURE — G2211 COMPLEX E/M VISIT ADD ON: HCPCS | Mod: 95,,, | Performed by: NURSE PRACTITIONER

## 2024-05-14 PROCEDURE — 4010F ACE/ARB THERAPY RXD/TAKEN: CPT | Mod: CPTII,95,, | Performed by: NURSE PRACTITIONER

## 2024-05-14 RX ORDER — UBROGEPANT 100 MG/1
100 TABLET ORAL 2 TIMES DAILY PRN
Qty: 10 TABLET | Refills: 2 | Status: SHIPPED | OUTPATIENT
Start: 2024-05-14

## 2024-05-14 RX ORDER — MULTIVITAMIN
1 TABLET ORAL DAILY
COMMUNITY

## 2024-05-14 RX ORDER — FREMANEZUMAB-VFRM 225 MG/1.5ML
225 INJECTION SUBCUTANEOUS
Qty: 1 EACH | Refills: 6 | Status: SHIPPED | OUTPATIENT
Start: 2024-05-14
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
## 2024-05-17 ENCOUNTER — TELEPHONE (OUTPATIENT)
Dept: RHEUMATOLOGY | Facility: CLINIC | Age: 34
End: 2024-05-17
Payer: MEDICAID

## 2024-05-17 NOTE — TELEPHONE ENCOUNTER
----- Message from MARIA L Valdes sent at 5/17/2024  9:27 AM CDT -----  Please advise the patient that all lab are noted to be clinically acceptable at this time, muscle enzymes noted to be normal, bilateral hip x-ray did show mild degenerative changes.  Please let her know she can consider PT to see if this helps with hip/back pain, otherwise continue to follow-up with PCP.  Referrals were sent to both Orthopedics and Podiatry, if she does not hear from either 1 of these in the next 2-3 weeks, please have a reach out to us so we can check on the status of the referral.  We will continue to monitor at future lab draws

## 2024-05-17 NOTE — TELEPHONE ENCOUNTER
Spoke to pt informed of message. Pt verbalized understanding. Pt  stated not interested in doing PT at this time because of her job situation.

## 2024-06-28 ENCOUNTER — TELEPHONE (OUTPATIENT)
Dept: NEUROLOGY | Facility: CLINIC | Age: 34
End: 2024-06-28
Payer: MEDICAID

## 2024-06-28 DIAGNOSIS — G43.111 MIGRAINE WITH AURA, INTRACTABLE, WITH STATUS MIGRAINOSUS: Primary | ICD-10-CM

## 2024-06-28 RX ORDER — DIVALPROEX SODIUM 250 MG/1
250 TABLET, DELAYED RELEASE ORAL EVERY 12 HOURS
Qty: 10 TABLET | Refills: 0 | Status: SHIPPED | OUTPATIENT
Start: 2024-06-28 | End: 2024-07-03

## 2024-06-28 RX ORDER — DEXAMETHASONE 4 MG/1
4 TABLET ORAL EVERY 12 HOURS
Qty: 10 TABLET | Refills: 0 | Status: SHIPPED | OUTPATIENT
Start: 2024-06-28 | End: 2024-07-03

## 2024-06-28 RX ORDER — KETOROLAC TROMETHAMINE 10 MG/1
10 TABLET, FILM COATED ORAL EVERY 6 HOURS
Qty: 20 TABLET | Refills: 0 | Status: SHIPPED | OUTPATIENT
Start: 2024-06-28 | End: 2024-07-03

## 2024-06-28 NOTE — TELEPHONE ENCOUNTER
----- Message from Amira Denis sent at 6/28/2024 11:47 AM CDT -----  Pt lvm @ 10:44 am requesting a call back about her migraines, pt can be reached @ [phone]

## 2024-06-28 NOTE — TELEPHONE ENCOUNTER
Please let Pt know to hold ibuprofen while taking ketorolac. Depakote and dexamethasone also called in

## 2024-06-28 NOTE — TELEPHONE ENCOUNTER
Patient reports migraine going on for 3 days, Ubrelvy not effective. Requesting migraine cocktail.

## 2024-09-09 PROBLEM — G43.111 MIGRAINE WITH AURA, INTRACTABLE, WITH STATUS MIGRAINOSUS: Chronic | Status: ACTIVE | Noted: 2023-11-09

## 2024-09-12 DIAGNOSIS — G43.111 MIGRAINE WITH AURA, INTRACTABLE, WITH STATUS MIGRAINOSUS: ICD-10-CM

## 2024-09-13 RX ORDER — UBROGEPANT 100 MG/1
TABLET ORAL
Qty: 10 TABLET | Refills: 0 | Status: SHIPPED | OUTPATIENT
Start: 2024-09-13

## 2024-09-18 ENCOUNTER — OFFICE VISIT (OUTPATIENT)
Dept: OBSTETRICS AND GYNECOLOGY | Facility: CLINIC | Age: 34
End: 2024-09-18
Payer: MEDICAID

## 2024-09-18 VITALS
HEART RATE: 86 BPM | BODY MASS INDEX: 62.35 KG/M2 | DIASTOLIC BLOOD PRESSURE: 70 MMHG | SYSTOLIC BLOOD PRESSURE: 130 MMHG | WEIGHT: 293 LBS

## 2024-09-18 DIAGNOSIS — Z01.419 ROUTINE GYNECOLOGICAL EXAMINATION: Primary | ICD-10-CM

## 2024-09-18 DIAGNOSIS — Z11.3 ENCOUNTER FOR SCREENING FOR INFECTIONS WITH A PREDOMINANTLY SEXUAL MODE OF TRANSMISSION: ICD-10-CM

## 2024-09-18 DIAGNOSIS — Z12.4 SCREENING FOR CERVICAL CANCER: ICD-10-CM

## 2024-09-18 DIAGNOSIS — N92.0 MENORRHAGIA WITH REGULAR CYCLE: ICD-10-CM

## 2024-09-18 LAB
ERYTHROCYTE [DISTWIDTH] IN BLOOD BY AUTOMATED COUNT: 13.4 % (ref 11–14.5)
HBV SURFACE AG SERPL QL IA: NEGATIVE
HBV SURFACE AG SERPL QL IA: NORMAL
HCT VFR BLD AUTO: 40.3 % (ref 36–48)
HGB BLD-MCNC: 13.4 G/DL (ref 11.8–16)
MCH RBC QN AUTO: 27.6 PG (ref 27–34)
MCHC RBC AUTO-ENTMCNC: 33.3 G/DL (ref 31–37)
MCV RBC AUTO: 83.1 FL (ref 79–99)
NRBC BLD AUTO-RTO: 0 %
PLATELET # BLD AUTO: 356 X10(3)/MCL (ref 140–371)
PMV BLD AUTO: 10.7 FL (ref 9.4–12.4)
RBC # BLD AUTO: 4.85 X10(6)/MCL (ref 4–5.1)
T4 FREE SERPL-MCNC: 0.79 NG/DL (ref 0.78–2.19)
TSH SERPL-ACNC: 2.31 UIU/ML (ref 0.36–3.74)
WBC # BLD AUTO: 11.34 X10(3)/MCL (ref 4–11.5)

## 2024-09-18 PROCEDURE — 86780 TREPONEMA PALLIDUM: CPT | Performed by: OBSTETRICS & GYNECOLOGY

## 2024-09-18 PROCEDURE — 99213 OFFICE O/P EST LOW 20 MIN: CPT | Mod: 25,,, | Performed by: OBSTETRICS & GYNECOLOGY

## 2024-09-18 PROCEDURE — 84439 ASSAY OF FREE THYROXINE: CPT | Performed by: OBSTETRICS & GYNECOLOGY

## 2024-09-18 PROCEDURE — 85027 COMPLETE CBC AUTOMATED: CPT | Performed by: OBSTETRICS & GYNECOLOGY

## 2024-09-18 PROCEDURE — 84443 ASSAY THYROID STIM HORMONE: CPT | Performed by: OBSTETRICS & GYNECOLOGY

## 2024-09-18 PROCEDURE — 87389 HIV-1 AG W/HIV-1&-2 AB AG IA: CPT | Performed by: OBSTETRICS & GYNECOLOGY

## 2024-09-18 PROCEDURE — 86803 HEPATITIS C AB TEST: CPT | Performed by: OBSTETRICS & GYNECOLOGY

## 2024-09-18 PROCEDURE — 1159F MED LIST DOCD IN RCRD: CPT | Mod: CPTII,,, | Performed by: OBSTETRICS & GYNECOLOGY

## 2024-09-18 PROCEDURE — 87340 HEPATITIS B SURFACE AG IA: CPT | Performed by: OBSTETRICS & GYNECOLOGY

## 2024-09-18 PROCEDURE — 87591 N.GONORRHOEAE DNA AMP PROB: CPT | Performed by: OBSTETRICS & GYNECOLOGY

## 2024-09-18 PROCEDURE — 99395 PREV VISIT EST AGE 18-39: CPT | Mod: ,,, | Performed by: OBSTETRICS & GYNECOLOGY

## 2024-09-18 PROCEDURE — 87491 CHLMYD TRACH DNA AMP PROBE: CPT | Performed by: OBSTETRICS & GYNECOLOGY

## 2024-09-18 PROCEDURE — 4010F ACE/ARB THERAPY RXD/TAKEN: CPT | Mod: CPTII,,, | Performed by: OBSTETRICS & GYNECOLOGY

## 2024-09-18 PROCEDURE — 87661 TRICHOMONAS VAGINALIS AMPLIF: CPT | Performed by: OBSTETRICS & GYNECOLOGY

## 2024-09-18 PROCEDURE — 3075F SYST BP GE 130 - 139MM HG: CPT | Mod: CPTII,,, | Performed by: OBSTETRICS & GYNECOLOGY

## 2024-09-18 PROCEDURE — 3008F BODY MASS INDEX DOCD: CPT | Mod: CPTII,,, | Performed by: OBSTETRICS & GYNECOLOGY

## 2024-09-18 PROCEDURE — 3078F DIAST BP <80 MM HG: CPT | Mod: CPTII,,, | Performed by: OBSTETRICS & GYNECOLOGY

## 2024-09-18 PROCEDURE — 1160F RVW MEDS BY RX/DR IN RCRD: CPT | Mod: CPTII,,, | Performed by: OBSTETRICS & GYNECOLOGY

## 2024-09-18 RX ORDER — SUMATRIPTAN SUCCINATE 25 MG/1
TABLET ORAL
COMMUNITY
Start: 2024-09-12

## 2024-09-18 RX ORDER — NORGESTIMATE AND ETHINYL ESTRADIOL 7DAYSX3 LO
1 KIT ORAL DAILY
COMMUNITY

## 2024-09-18 NOTE — PROGRESS NOTES
Patient ID: [id number]   Chief Complaint: Annual exam  Chief Complaint   Patient presents with    Annual Exam     Complains of heavy, painful cycles.      HPI:   Mackenzie Church is a 33 y.o. year old  here for her Annual Exam.   Patient's last menstrual period was 09/15/2024.   Patient complains of heavy cycles, lasting 5-7 days, passing clots and severe cramps.  Has been to the [facility] several times for bleeding and pain.   States has fibroids. Has been on OCPS with no relief. Stopped using them.  Would like STD serologies done.  Subjective:     Past Medical History:   Diagnosis Date    Carpal tunnel syndrome     Endometriosis, unspecified     Hiatal hernia     HTN (hypertension)     Legal blindness     Migraine     Neuropathy     PCOS (polycystic ovarian syndrome)     Sciatica      Past Surgical History:   Procedure Laterality Date    REATTACHMENT OF FINGER Right     second and third digits    TONSILLECTOMY, ADENOIDECTOMY       Social History     Tobacco Use    Smoking status: Every Day     Current packs/day: 0.50     Average packs/day: 0.5 packs/day for 20.0 years (10.0 ttl pk-yrs)     Types: Cigarettes    Smokeless tobacco: Never    Tobacco comments:     Working on quitting, went from 2 packs daily to 1/2 pack daily   Substance Use Topics    Alcohol use: Not Currently     Comment: socially    Drug use: Never     Family History   Problem Relation Name Age of Onset    Fibromyalgia Mother      Lupus Mother      HIV Mother      Bipolar disorder Mother      Lung cancer Father      Lupus Sister      Graves' disease Brother      Prostate cancer Paternal Uncle      Stomach cancer Paternal Grandmother       OB History    Para Term  AB Living   8 0           SAB IAB Ectopic Multiple Live Births                  # Outcome Date GA Lbr Melvin/2nd Weight Sex Type Anes PTL Lv   8             7             6             5             4             3              2             1                 Current Outpatient Medications:     cholecalciferol, vitamin D3, 1,250 mcg (50,000 unit) capsule, Take 50,000 Units by mouth every 7 days., Disp: , Rfl:     fremanezumab-vfrm (AJOVY AUTOINJECTOR) 225 mg/1.5 mL autoinjector, Inject 1.5 mLs (225 mg total) into the skin every 28 days., Disp: 1 each, Rfl: 6    gabapentin (NEURONTIN) 300 MG capsule, Take 300 mg by mouth 3 (three) times daily., Disp: , Rfl:     hydrALAZINE (APRESOLINE) 25 MG tablet, Take 25 mg by mouth every 8 (eight) hours as needed., Disp: , Rfl:     hydroCHLOROthiazide (HYDRODIURIL) 25 MG tablet, Take 25 mg by mouth once daily., Disp: , Rfl:     ibuprofen (ADVIL,MOTRIN) 800 MG tablet, Take 1 tablet (800 mg total) by mouth 2 (two) times a day., Disp: 60 tablet, Rfl: 1    levocetirizine (XYZAL) 5 MG tablet, Take 5 mg by mouth once daily., Disp: , Rfl:     lisinopriL 10 MG tablet, Take 10 mg by mouth once daily., Disp: , Rfl:     magnesium oxide (MAG-OX) 400 mg (241.3 mg magnesium) tablet, Take 1 tablet (400 mg total) by mouth every evening., Disp: 30 tablet, Rfl: 6    multivitamin (THERAGRAN) per tablet, Take 1 tablet by mouth once daily., Disp: , Rfl:     norgestimate-ethinyl estradioL (ORTHO TRI-CYCLEN LO) 0.18/0.215/0.25 mg-25 mcg tablet, Take 1 tablet by mouth once daily., Disp: , Rfl:     oxybutynin (DITROPAN) 5 MG Tab, Take 5 mg by mouth once daily., Disp: , Rfl:     RED BEET ROOT-SOUR CHERRY EXT ORAL, Take by mouth., Disp: , Rfl:     sumatriptan (IMITREX) 25 MG Tab, Take by mouth., Disp: , Rfl:     TAZTIA  mg Cs24, Take 240 mg by mouth once daily., Disp: , Rfl:     tiZANidine (ZANAFLEX) 4 MG tablet, Take 4 mg by mouth every evening. Takes PRN, Disp: , Rfl:     traZODone (DESYREL) 50 MG tablet, Take 50 mg by mouth every evening., Disp: , Rfl:     ubrogepant (UBRELVY) 100 mg tablet, TAKE ONE TABLET BY MOUTH TWICE DAILY AS NEEDED FOR MIGRAINE. IF symptoms persist OR return, may repeat dose AFTER  TWO hours. max OF 200mg per 24 HOURS, Disp: 10 tablet, Rfl: 0    valACYclovir (VALTREX) 1000 MG tablet, Take 1,000 mg by mouth 2 (two) times daily as needed., Disp: , Rfl:     MENARCHEAL:  Cycle Length: 5-7 days   Flow: HEAVY  Dysmenorrhea: YES  Intermenstrual Bleeding: No    PAP:  Last PAP: 2/8/2021    History of Abnormal PAP Smear: YES:   Treated: Colposcopy  HPV Vaccine: NO    INTERCOURSE:  Dyspareunia: No  Postcoital Bleeding: No  History of STI: No   If yes, then: No   Current Birth Control Method: OCP (estrogen/progesterone)  Sexually Active: NO  Review of Systems 12 point review of systems conducted, negative except as stated in the history of present illness.     See HPI for details.  Objective:   Visit Vitals  /70   Pulse 86   Wt (!) 159.7 kg (352 lb)   LMP 09/15/2024   BMI 62.35 kg/m²     No results found for this or any previous visit (from the past 24 hour(s)).  Physical Exam:  Chaperone present for exam.  Physical Exam  Constitutional:  General Appearance : alert, in no acute distress, normal, well nourished.  Cardiovascular:   Regular rate and rhythm.  Respiratory:  Respiratory Effort: normal.  Breast:  Right: Inspection/palpation: no discharge, no masses present, no nipple retraction, no skin changes, no skin dimpling, no tenderness, no lymphadenopathy, no axillary mass, no axillary tenderness.  Left: Inspection/palpation: no discharge, no masses present, no nipple retraction, no skin changes, no skin dimpling, no tenderness, no lymphadenopathy, no axillary mass, no axillary tenderness.  Gastrointestinal:  Abdomen: no masses. no tender, nondistended.  Genitourinary:  External Genitalia: normal, no lesions.  Vagina: normal appearance, no abnormal discharge, no lesions.  Bladder: no mass, nontender.  Urethra: no erythema or lesions present.  Cervix: no lesions, non tender. PAP/CXS  Uterus: nontender, normal contour, normal mobility, normal size.   Adnexa: no masses, no tenderness.  Anus and  Perineum: visually normal.     No results found for this or any previous visit (from the past 24 hour(s)).  Assessment/Plan:   Assessment:   Routine gynecological examination  -     Cancel: Liquid-Based Pap Smear, Screening  -     Liquid-Based Pap Smear, Screening    Encounter for screening for infections with a predominantly sexual mode of transmission  -     Cancel: Liquid-Based Pap Smear, Screening  -     HIV 1/2 Ag/Ab (4th Gen)  -     SYPHILIS ANTIBODY (WITH REFLEX RPR)  -     Hepatitis C Antibody  -     Hepatitis B Surface Antigen  -     Liquid-Based Pap Smear, Screening    Menorrhagia with regular cycle  -     CBC Without Differential; Future; Expected date: 09/18/2024  -     TSH; Future; Expected date: 09/18/2024  -     T4, Free; Future; Expected date: 09/18/2024  -     US Pelvis Complete Non OB; Future; Expected date: 09/18/2024    Screening for cervical cancer  -     Liquid-Based Pap Smear, Screening      Follow up in about 2 weeks (around 10/2/2024) for GINNY KOHLI.     In addition to their scheduled follow-up, the patient has also been instructed to follow up on as needed basis.   All questions were answered and the patient voiced understanding of the above issues.

## 2024-09-19 LAB
HCV AB SERPL QL IA: NONREACTIVE
HIV 1+2 AB+HIV1 P24 AG SERPL QL IA: NONREACTIVE
T PALLIDUM AB SER QL: NONREACTIVE

## 2024-09-24 ENCOUNTER — OFFICE VISIT (OUTPATIENT)
Dept: ORTHOPEDICS | Facility: CLINIC | Age: 34
End: 2024-09-24
Payer: MEDICAID

## 2024-09-24 VITALS
DIASTOLIC BLOOD PRESSURE: 91 MMHG | TEMPERATURE: 98 F | HEART RATE: 83 BPM | SYSTOLIC BLOOD PRESSURE: 133 MMHG | WEIGHT: 293 LBS | HEIGHT: 63 IN | BODY MASS INDEX: 51.91 KG/M2

## 2024-09-24 DIAGNOSIS — G56.03 BILATERAL CARPAL TUNNEL SYNDROME: Primary | ICD-10-CM

## 2024-09-24 PROCEDURE — 99203 OFFICE O/P NEW LOW 30 MIN: CPT | Mod: S$PBB,,, | Performed by: NURSE PRACTITIONER

## 2024-09-24 PROCEDURE — 1160F RVW MEDS BY RX/DR IN RCRD: CPT | Mod: CPTII,,, | Performed by: NURSE PRACTITIONER

## 2024-09-24 PROCEDURE — 1159F MED LIST DOCD IN RCRD: CPT | Mod: CPTII,,, | Performed by: NURSE PRACTITIONER

## 2024-09-24 PROCEDURE — 3080F DIAST BP >= 90 MM HG: CPT | Mod: CPTII,,, | Performed by: NURSE PRACTITIONER

## 2024-09-24 PROCEDURE — 4010F ACE/ARB THERAPY RXD/TAKEN: CPT | Mod: CPTII,,, | Performed by: NURSE PRACTITIONER

## 2024-09-24 PROCEDURE — 3075F SYST BP GE 130 - 139MM HG: CPT | Mod: CPTII,,, | Performed by: NURSE PRACTITIONER

## 2024-09-24 PROCEDURE — 3008F BODY MASS INDEX DOCD: CPT | Mod: CPTII,,, | Performed by: NURSE PRACTITIONER

## 2024-09-24 PROCEDURE — 99215 OFFICE O/P EST HI 40 MIN: CPT | Mod: PBBFAC | Performed by: NURSE PRACTITIONER

## 2024-09-24 NOTE — PROGRESS NOTES
[facility]  Subjective:   PATIENT ID: Mackenzie Church is a 33 y.o. female. Smoker. Employment HX: [employer], currently employed.    Seen [facility] ortho for same DX since n/a.   CHIEF COMPLAINT: Carpal Tunnel of the Right Hand (Here With Bilat Hand Pain / CTS Pain Level 3 and Intermittent) and Carpal Tunnel of the Left Hand    HPI:    Bilateral L < R numbness, tingling in palm and of first 1-3 digits. Right dominant   Injury/ Surgeries r/t CC:  right index, middle finger surgical repair to accidental self amputation    Onset: several years ago fluctuates   Modifying Factors: exacerbated by overuse, driving; improved with shaking of hands  Associated Symptoms: hand weakness w/ dropping items, endorses sleep disturbance, endorses shooting pain into arm, denies neck pain  Activity: sedentary with light activity and pain moderately interferes with ADLs .   Previous Treatments: nocturnal wrist splint, OTC medications: Tylenol, ibuprofen, RX medications: gabapentin, and CSI since years ago last injection years ago x2  with adequate relief but symptoms returning  PMH:  Fibromyalgia, Raynaud's, HTN  Family History: + OA    NOTE: New patient referred for right hand carpal tunnel, in clinic today c/o bilateral hand numbness with minimal conservative treatments.  Symptoms affecting ADLs.     Current Outpatient Medications:     cholecalciferol, vitamin D3, 1,250 mcg (50,000 unit) capsule, Take 50,000 Units by mouth every 7 days., Disp: , Rfl:     fremanezumab-vfrm (AJOVY AUTOINJECTOR) 225 mg/1.5 mL autoinjector, Inject 1.5 mLs (225 mg total) into the skin every 28 days., Disp: 1 each, Rfl: 6    gabapentin (NEURONTIN) 300 MG capsule, Take 300 mg by mouth 3 (three) times daily., Disp: , Rfl:     hydrALAZINE (APRESOLINE) 25 MG tablet, Take 25 mg by mouth every 8 (eight) hours as needed., Disp: , Rfl:     hydroCHLOROthiazide (HYDRODIURIL) 25 MG tablet, Take 25 mg by mouth once  "daily., Disp: , Rfl:     ibuprofen (ADVIL,MOTRIN) 800 MG tablet, Take 1 tablet (800 mg total) by mouth 2 (two) times a day., Disp: 60 tablet, Rfl: 1    levocetirizine (XYZAL) 5 MG tablet, Take 5 mg by mouth once daily., Disp: , Rfl:     lisinopriL 10 MG tablet, Take 10 mg by mouth once daily., Disp: , Rfl:     magnesium oxide (MAG-OX) 400 mg (241.3 mg magnesium) tablet, Take 1 tablet (400 mg total) by mouth every evening., Disp: 30 tablet, Rfl: 6    multivitamin (THERAGRAN) per tablet, Take 1 tablet by mouth once daily., Disp: , Rfl:     norgestimate-ethinyl estradioL (ORTHO TRI-CYCLEN LO) 0.18/0.215/0.25 mg-25 mcg tablet, Take 1 tablet by mouth once daily., Disp: , Rfl:     oxybutynin (DITROPAN) 5 MG Tab, Take 5 mg by mouth once daily., Disp: , Rfl:     RED BEET ROOT-SOUR CHERRY EXT ORAL, Take by mouth., Disp: , Rfl:     sumatriptan (IMITREX) 25 MG Tab, Take by mouth., Disp: , Rfl:     TAZTIA  mg Cs24, Take 240 mg by mouth once daily., Disp: , Rfl:     tiZANidine (ZANAFLEX) 4 MG tablet, Take 4 mg by mouth every evening. Takes PRN, Disp: , Rfl:     traZODone (DESYREL) 50 MG tablet, Take 50 mg by mouth every evening., Disp: , Rfl:     ubrogepant (UBRELVY) 100 mg tablet, TAKE ONE TABLET BY MOUTH TWICE DAILY AS NEEDED FOR MIGRAINE. IF symptoms persist OR return, may repeat dose AFTER TWO hours. max OF 200mg per 24 HOURS, Disp: 10 tablet, Rfl: 0    valACYclovir (VALTREX) 1000 MG tablet, Take 1,000 mg by mouth 2 (two) times daily as needed., Disp: , Rfl:   Review of patient's allergies indicates:   Allergen Reactions    Robaxin [methocarbamol] Itching and Nausea And Vomiting     REVIEW OF SYSTEMS:  A ten-point review of systems was performed and is negative, except as mentioned above  Objective:   Body mass index is 62.52 kg/m².   Vitals:    09/24/24 0816 09/24/24 0819   BP: (!) 156/92 (!) 133/91   Pulse: 83    Temp: 98.1 °F (36.7 °C)    TempSrc: Oral    Weight: (!) 160.1 kg (352 lb 15.3 oz)    Height: 5' 3" (1.6 " m)    PainSc:   3       MSK Hand/ Wrist Exam  General:  no apparent distress, no pain indicators,  obesity  Inspection:  LEFT HAND RIGHT HAND   no joint swelling, no mass/ cyst noted, no soft tissue swelling, no erythema, no lesions, no contusion, no gross deformities, no nodules, no atrophy of thenar or hypothenar eminence,  no scars, no discoloration noted no joint swelling, no mass/ cyst noted, no soft tissue swelling, no erythema, no lesions, no contusion, no gross deformities, no nodules, no atrophy of thenar or hypothenar eminence,  no scars, no discoloration noted   Palpation:     LEFT HAND RIGHT HAND   no joint tenderness, normal temperature, no palpable palm nodules, no finger joint tenderness or crepitus, no active triggering or locking of digits and no tenderness of digital flexor tendons, no tenderness of thenar and hypothenar eminence no joint tenderness, normal temperature, no palpable palm nodules, no finger joint tenderness or crepitus, no active triggering or locking of digits and no tenderness of digital flexor tendons, no tenderness of thenar and hypothenar eminence     ROM LEFT HAND RIGHT HAND   Wrist Flexion / Extension   80 / 75 80 / 75   Wrist Radial / Ulnar Deviation   15 / 30 15 / 30   Thumb CMC/ MCP/ IP WNL w/o pain WNL w/o pain   Finger MCP/PIP/DIP WNL w/o pain WNL w/o pain      Strength LEFT HAND RIGHT HAND    4 / 5 w/o pain 4 / 5 w/o pain       Special Testing: L+ L-- R+ R-- Not Tested     Carpal Tunnel Syndrome         Phalen [x] [] [x] [] []    Eder Carpal Compression [x] [] [x] [] []    Cubital Tunnel Syndrome         Tinel's Test @ elbow [] [x] [] [x] []    De Quervain's Tenosynovitis         Finkelstein Test [] [x] [] [x] []    Ligament Injury         Scaphoid Shift Test [] [] [] [] [x]    Lunotriquetral Shuck Test [] [] [] [] [x]    UCL Ligament Thumb Test [] [] [] [] [x]    Ulnar Fovea Sign [] [] [] [] [x]    Nerve Injury  Radial Nerve  IP joint extension against resistance  intact   Median Nerve Palmar abduction of thumb intact   Anterior Interosseous Branch OK sign intact   Ulnar Nerve   Abduction of fingers against resistance intact, Froment's sign negative   Neuro/ Vascular: Spurling's Test positive, Intact to light touch, capillary refill 2 seconds,  radial pulse equal 2+, 2 point discrimination intact, Mundo's test intact  Psych: Awake, alert, oriented, normal mood and affect  Lymphatic: No LAD  Skin/ Soft Tissue: no rash, skin intact  Cardio: no edema, vascular integrity noted  Resp: no increased respiratory effort noted   Assessment:   IMAGING: XR noted in EMR dated 11/9/23  3 views of bilateral hand reviewed and independently interpreted by me with no acute findings noted.  Radiologist findings reviewed.  Findings discussed with patient today.    EXAMINATION: XR HAND COMPLETE 3 VIEW RIGHT 11/9/23  CLINICAL HISTORY:  Fibromyalgia  TECHNIQUE:  Radiographs of the right hand with AP, lateral and oblique  views.  COMPARISON:  No prior imaging available for comparison  FINDINGS:  There is no acute fracture, subluxation or dislocation.  Joints and interspaces appear maintained.  Osseous structures show normal bone mineral density.  Soft tissues are unremarkable.  There are no radiopaque foreign bodies.  Impression:  No acute osseous abnormality, fracture, or dislocation.  There is no significant degenerative change.    EXAMINATION: XR HAND COMPLETE 3 VIEW LEFT 11/9/23  CLINICAL HISTORY:  Fibromyalgia  TECHNIQUE:  Radiographs of the left hand with AP, lateral and oblique  views.  COMPARISON:  No prior imaging available for comparison  FINDINGS:  There is no acute fracture, subluxation or dislocation.  Joints and interspaces appear maintained.  Osseous structures show normal bone mineral density.  Soft tissues are unremarkable.  There are no radiopaque foreign bodies.  Impression:  No acute osseous abnormality, fracture, or dislocation.  There is no significant degenerative  "change.    Labs:  No results found for: "HGBA1C"   EMG Study: none  EMR REVIEW: completed with noted Referral documentation reviewed  DX & Plan:   DIAGNOSIS: Moderate exacerbation of chronic Bilateral L < R Carpal tunnel syndrome bilateral, complicated by cervical radiculopathy    1. Bilateral carpal tunnel syndrome    2. BMI 60.0-69.9, adult       TREATMENT PLAN:      Treatment Plan: EMG study needed for definitive DX. Patient referred to [facility] [phone] Address: [address] [place] [address] [address] [place] [address]. Patient instructed to call clinic for f/u once study completed. Appropriate treatment plan will be based on EMG findings.   Ongoing education about DX and treatment recommendations including conservative treatments of daily HEP for carpal tunnel, nocturnal splinting of wrist PRN and OTC NSAID as needed according to label instructions if able to tolerate.    Procedure: n/a, offered, patient declines s/t failure in past   RX Medications: continue medications as RX per PCP .     RTC: after EMG study complete        Leah Menard-Neumann FNP [facility]  Procedure Note:   None  Time Based Billing   Total Time Spent with Patient: 30 minutes .  Visit Start Time: 0830  10 minutes spent prior to visit reviewing EMR, prior labs and x-rays.  10 minutes spent in visit with patient face-to-face time completing exam, obtaining history, educating on DX and treatment plan.  10 minutes spent after visit completing EMR documentation.   Visit End Time: 0900     Please be aware that this note has been generated with the assistance of MMjalen voice-to-text.  Please excuse any spelling or grammatical errors.   "

## 2024-10-01 ENCOUNTER — PATIENT MESSAGE (OUTPATIENT)
Dept: NEUROLOGY | Facility: CLINIC | Age: 34
End: 2024-10-01

## 2024-10-01 ENCOUNTER — OFFICE VISIT (OUTPATIENT)
Dept: NEUROLOGY | Facility: CLINIC | Age: 34
End: 2024-10-01
Payer: MEDICAID

## 2024-10-01 DIAGNOSIS — E66.813 CLASS 3 SEVERE OBESITY DUE TO EXCESS CALORIES WITHOUT SERIOUS COMORBIDITY WITH BODY MASS INDEX (BMI) OF 60.0 TO 69.9 IN ADULT: ICD-10-CM

## 2024-10-01 DIAGNOSIS — G47.33 OSA ON CPAP: ICD-10-CM

## 2024-10-01 DIAGNOSIS — G43.111 MIGRAINE WITH AURA, INTRACTABLE, WITH STATUS MIGRAINOSUS: Primary | Chronic | ICD-10-CM

## 2024-10-01 DIAGNOSIS — E66.01 CLASS 3 SEVERE OBESITY DUE TO EXCESS CALORIES WITHOUT SERIOUS COMORBIDITY WITH BODY MASS INDEX (BMI) OF 60.0 TO 69.9 IN ADULT: ICD-10-CM

## 2024-10-01 DIAGNOSIS — F17.200 SMOKING: ICD-10-CM

## 2024-10-01 PROCEDURE — 4010F ACE/ARB THERAPY RXD/TAKEN: CPT | Mod: CPTII,95,, | Performed by: NURSE PRACTITIONER

## 2024-10-01 PROCEDURE — 1159F MED LIST DOCD IN RCRD: CPT | Mod: CPTII,95,, | Performed by: NURSE PRACTITIONER

## 2024-10-01 PROCEDURE — 1160F RVW MEDS BY RX/DR IN RCRD: CPT | Mod: CPTII,95,, | Performed by: NURSE PRACTITIONER

## 2024-10-01 PROCEDURE — 99214 OFFICE O/P EST MOD 30 MIN: CPT | Mod: 95,,, | Performed by: NURSE PRACTITIONER
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
## 2024-10-16 DIAGNOSIS — G43.111 MIGRAINE WITH AURA, INTRACTABLE, WITH STATUS MIGRAINOSUS: ICD-10-CM

## 2024-10-16 RX ORDER — UBROGEPANT 100 MG/1
TABLET ORAL
Qty: 10 TABLET | Refills: 2 | Status: SHIPPED | OUTPATIENT
Start: 2024-10-16

## 2024-10-16 RX ORDER — LANOLIN ALCOHOL/MO/W.PET/CERES
1 CREAM (GRAM) TOPICAL
Qty: 30 TABLET | Refills: 2 | Status: SHIPPED | OUTPATIENT
Start: 2024-10-16

## 2024-11-11 RX ORDER — SUMATRIPTAN SUCCINATE 25 MG/1
25 TABLET ORAL
COMMUNITY
Start: 2024-10-16

## 2024-11-12 NOTE — PROGRESS NOTES
Patient ID: [id number]     Chief Complaint: Fibromyalgia (Pt stated  having hips and hands pain)      HPI:     Mackenzie Church is a 34 y.o. female here today for a follow up visit.      Patient was previously being seen by Dr. Braxton as her PCP but switched providers. She reports having history of pain primarily from her right shoulder down to her knees on the right side of her body. She also reports having pain in her left shoulder and left foot. She was previously prescribed Plaquenil 200 mg BID which she has been taking for over a year though she has not noted any relief with treatment. Patient does reports history of Raynaud's phenomenon. She reports previous extensive workup of her symptoms but was not given a definitive diagnosis. Of note, patient does report history of poor sleep/insomnia with difficulty falling asleep. Also reports not feeling well rested when she awakens from sleep even if she get 7-8 hours of rest. Patient does report working on trying to improve her health and has started going to the gym with reported 140 lb weight loss.     She saw Dr Braxton in the past. Now seeing Ms Cirilo EVE Lawson     May 2024: Here today for follow up. Since her last visit she was tested for sleep apnea and diagnosed with JOSE- she was started on CPAP but is having issues with the mask, waiting a call back from [facility]. She continues to work out 3-4 times a week at the gym, walking at least 30 miles a week- she is also on her feet and moving all day at work, works as a  at [employer]. She reports having red/warm/swollen joints to her hands and right knee. She reports her hands have really been bothering her, she states her Raynaud's has been bothering her, reports worse with cold, always trying to keep her hands warm, hands going numb at times- also suffers with CTS on right, uses night splints and does help, has had injections in the past and was also beneficial but has not had in some time. She does  report small sores on her finger tips that will last a week some days. She also reports she has been small blisters on her fingers now on her feet and she also reports happening in her hairline. She reports blisters are very painful. She has been feeling bad for the past year but feels symptoms are worsening. She also reports muscle weakness, she states she has been dropping objects, also occurring in her legs as well, initially thinking it was due to sciatica, she reports she has had falls in the past, feels weak and falls, she reports a tingling sensation then legs will give out.  She states weakness as been present for sometime. She will get occ red spots mainly to her forearms, otherwise no rashes, no itching/burning/etc, thought possibly eczema- comes and goes. She suffers with Migraines- recently had her meds adjusted and reports much better but still has occ headaches, currently has one today- follows with Neurology.     November 2024: Here today for follow up. She does having some swelling to her right knee- states did have a fall a while back and states knee has been bothersome- has improved but will come and go- will have some popping that comes and goes- she was suppose to be referred to Ortho but has not heard from anyone yet- she also reports previous injury to the same knee back in 2019- states took months to heal- was not seen by ortho at this time either. She did have testing with [facility]- reports was +- waiting possible surgery but waiting to hear back from Ortho for follow up apt- has messaged on the portal. She does have some swelling in her hands/fingers but assuming due to CTS, otherwise no red/warm/swollen joints. Morning stiffness lasting 1-2 hours. She continues to have issues with Raynauds- denies being worse with cold- denies any ulcerations on fingers or toes- states will turn more purple in color. Denies any issues with rashes. Migraines ok follows with Neurology. She did get back in  touch [facility] for CPAP but reports unable to tolerate mask so has not used. She continues to work out 3-4 times a week at the gym. She continues to have right hip pain, lower back, radiating into her right leg, she does report numbness and tingling and stiffness. Feels like leg is weak at times, has not completed PT at this time.     Denies any recent illness or hospitalizations since last visit.     Dr. Rodriguez- Cardio  Dr. Moore/Chayo Butt, NP- Neurology   Dr. Macdonald- GYN (Endometriosis/PCOS/Ovarian Cyst)    Denies history of fevers, rashes, photosensitivity, oral or nasal ulcers, h/o MI, stroke, seizures, h/o PE or DVT, uveitis, malignancies.   Family history of autoimmune disease: Lupus in her sister and mother; Fibromyalgia in maternal aunts, brother with Graves  Pregnancy: 6 Miscarriages: 8, all in first trimester   Smoking: Start age 11 years, 1/2 ppd, hx 2 ppd x 10+ years, working on quitting- congratulated on efforts- plans on starting patches to help with cessation.     Social History     Tobacco Use   Smoking Status Every Day    Current packs/day: 0.50    Average packs/day: 0.5 packs/day for 20.0 years (10.0 ttl pk-yrs)    Types: Cigarettes, Vaping with nicotine   Smokeless Tobacco Never   Tobacco Comments    Working on quitting, went from 2 packs daily to 1/2 pack daily          -------------------------------------    Carpal tunnel syndrome    Endometriosis, unspecified    Hiatal hernia    HTN (hypertension)    Legal blindness    Migraine    Neuropathy    PCOS (polycystic ovarian syndrome)    Sciatica        Past Surgical History:   Procedure Laterality Date    REATTACHMENT OF FINGER Right     second and third digits    TONSILLECTOMY, ADENOIDECTOMY         Review of patient's allergies indicates:   Allergen Reactions    Robaxin [methocarbamol] Itching and Nausea And Vomiting       Outpatient Medications Marked as Taking for the 11/13/24 encounter (Office Visit) with Alexandria Tamayo FNP    Medication Sig Dispense Refill    cholecalciferol, vitamin D3, 1,250 mcg (50,000 unit) capsule Take 50,000 Units by mouth every 7 days.      fremanezumab-vfrm (AJOVY AUTOINJECTOR) 225 mg/1.5 mL autoinjector Inject 1.5 mLs (225 mg total) into the skin every 28 days. 1 each 6    gabapentin (NEURONTIN) 300 MG capsule Take 300 mg by mouth 3 (three) times daily.      hydrALAZINE (APRESOLINE) 25 MG tablet Take 25 mg by mouth every 8 (eight) hours as needed.      hydroCHLOROthiazide (HYDRODIURIL) 25 MG tablet Take 25 mg by mouth once daily.      ibuprofen (ADVIL,MOTRIN) 800 MG tablet Take 1 tablet (800 mg total) by mouth 2 (two) times a day. 60 tablet 1    levocetirizine (XYZAL) 5 MG tablet Take 5 mg by mouth once daily.      lisinopriL 10 MG tablet Take 10 mg by mouth once daily.      magnesium oxide (MAG-OX) 400 mg (241.3 mg magnesium) tablet TAKE ONE TABLET BY MOUTH EVERY DAY 30 tablet 2    multivitamin (THERAGRAN) per tablet Take 1 tablet by mouth once daily.      norgestimate-ethinyl estradioL (ORTHO TRI-CYCLEN LO) 0.18/0.215/0.25 mg-25 mcg tablet Take 1 tablet by mouth once daily.      oxybutynin (DITROPAN) 5 MG Tab Take 5 mg by mouth once daily.      RED BEET ROOT-SOUR CHERRY EXT ORAL Take by mouth.      sumatriptan (IMITREX) 25 MG Tab Take 25 mg by mouth every 2 (two) hours as needed.      sumatriptan (IMITREX) 50 MG tablet Take 50 mg by mouth every 2 (two) hours as needed for Migraine.      TAZTIA  mg Cs24 Take 240 mg by mouth once daily.      tiZANidine (ZANAFLEX) 4 MG tablet Take 4 mg by mouth every evening. Takes PRN      traZODone (DESYREL) 50 MG tablet Take 50 mg by mouth every evening.      ubrogepant (UBRELVY) 100 mg tablet TAKE ONE TABLET BY MOUTH TWICE DAILY AS NEEDED FOR MIGRAINE. IF symptoms persist OR return, may repeat dose AFTER TWO hours. max OF 200mg per 24 HOURS 10 tablet 2    valACYclovir (VALTREX) 1000 MG tablet Take 1,000 mg by mouth 2 (two) times daily as needed.         Social History  "    Socioeconomic History    Marital status: Single   Tobacco Use    Smoking status: Every Day     Current packs/day: 0.50     Average packs/day: 0.5 packs/day for 20.0 years (10.0 ttl pk-yrs)     Types: Cigarettes, Vaping with nicotine    Smokeless tobacco: Never    Tobacco comments:     Working on quitting, went from 2 packs daily to 1/2 pack daily   Substance and Sexual Activity    Alcohol use: Not Currently     Comment: socially    Drug use: Never    Sexual activity: Not Currently     Birth control/protection: OCP        Family History   Problem Relation Name Age of Onset    Fibromyalgia Mother      Lupus Mother      HIV Mother      Bipolar disorder Mother      Lung cancer Father      Lupus Sister      Graves' disease Brother      Prostate cancer Paternal Uncle      Stomach cancer Paternal Grandmother          Immunization History   Administered Date(s) Administered    Tdap 02/23/2021       Patient Care Team:  Sue Cantu FNP as PCP - General (Family Medicine)     Subjective:     Constitutional:  Denies chills. Denies fever. Admits occ night sweats. Denies weight loss.  Ophthalmology: Denies blurred vision. Denies dry eyes. Denies eye pain. Denies Itching and redness. Legally blind since the age of 13- states " deterioration" follows [facility]  ENT: Denies oral ulcers. Denies epistaxis. Denies dry mouth. Denies swollen glands.    Endocrine: Denies diabetes. Denies thyroid Problems.   Respiratory: Admits dry cough- occ. Denies shortness of breath. Denies shortness of breath with exertion. Denies hemoptysis.   Cardiovascular: Denies chest pain at rest. Denies chest pain with exertion. Admits palpitations- stable, follows with Cardiology  Gastrointestinal: Denies abdominal pain. Admits diarrhea. Denies nausea. Denies vomiting. Denies hematemesis or hematochezia. Denies heartburn. Admits IBS-D  Genitourinary: Denies blood in urine.  Musculoskeletal: See HPI for details  Integumentary: Denies rash. " "Denies photosensitivity.   Peripheral Vascular: Denies Ulcers of hands and/or feet. Denies Cold extremities.   Neurologic: Denies dizziness. Admits headache- controlled.  Admits loss of strength to BUE from CTS. Admits numbness or tingling to hands and feet  Psychiatric: Admits depression and anxiety, reports was diagnosed with Bipolar I in the past but has not been on meds for over 15 years, overall has been stable. Denies suicidal/homicidal ideations.      Objective:     BP (!) 160/90 (Patient Position: Sitting)   Pulse 94   Temp 97.8 °F (36.6 °C) (Oral)   Resp 20   Ht 5' 3" (1.6 m)   Wt (!) 161.7 kg (356 lb 6.4 oz)   SpO2 98%   BMI 63.13 kg/m²     Physical Exam    General Appearance: alert, pleasant, in no acute distress.  Skin: Skin color, texture, turgor normal. No rashes noted  Eyes:  extraocular movement intact (EOMI), pupils equal, round, reactive to light and accommodation, conjunctiva clear.  ENT: No oral or nasal ulcers.  Neck:  Neck supple. No adenopathy.   Lungs: CTA throughout without crackles, rhonchi, or wheezes.   Heart: RRR w/o murmurs.  No edema. 2+ DP pulse. Normal capillary refill  Neuro: Alert, oriented, CN II-XII GI, sensory and motor innervation intact.  Musculoskeletal: Several tender points noted diffusely/bilaterally, tender everywhere palpated, puffy hands bilaterally, no active synovitis noted to bilateral MCPs/PIPs, FROM noted to bilateral wrists, elbows and shoulders with no pain, no pain to bilateral ankles or MTPs, knees with mild crepitus noted    Psych: Alert, oriented, normal eye contact.     Labs:   23: CMP and CBC ok.  RANDAL negative.  Anti CCP negative.  Rheumatoid factor negative.    2024 Sed Rate 44 (<20), CMP Glucose 71, otherwise ok. CRP 12.50 (<5), CBC ok. CPK 80 ok. Aldolase ok, HLA B27   Negative.     Imagin/9/23:  Normal x-ray of bilateral hands.  X-ray of right foot showed punctate density along plantar aspect of interspace between the 1st and 2nd " metatarsal may represent foreign body.  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  X-ray of lumbar spine showed DJD changes in posterior elements at L4-L5 and L5-S1, flexion and extension views were obtained with no evidence of instability.  X-ray of bilateral shoulders showed mild narrowing of AC and glenohumeral joints.    5/13/2024 Left and Right Hip Xray: Impression:  Minimal degenerative changes.    Assessment:       ICD-10-CM ICD-9-CM   1. Fibromyalgia  M79.7 729.1   2. Raynaud's phenomenon without gangrene  I73.00 443.0   3. JOSE on CPAP  G47.33 327.23   4. Primary hypertension  I10 401.9   5. Cigarette nicotine dependence without complication  F17.210 305.1   6. Plantar fasciitis  M72.2 728.71   7. Carpal tunnel syndrome of right wrist  G56.01 354.0     Plan:     Fibromyalgia  - Discussed clinical features of fibromyalgia in detail again today as she continues to have several tender points noted on exam.  Fibromyalgia is a chronic pain syndrome.  Underlying causes of fibromyalgia may be numerous.  An underlying nonrestorative sleep pattern, mental and physical stressors play a role in pain perception.  - Discussed relationship of pain with sleep.  The brain functions best with a normal restful sleep that includes REM sleep.  Discussed that a nonrestorative sleep pattern may cause a decrease in pain tolerance.  Discussed that over time, this builds up and causes a cycling effect on pain.  This pain is not easily curable with pain medications or narcotics.  It is important to decrease stress levels and improving sleep patterns/hygiene.  A restorative sleep pattern is important in improving the perception of pain. She has been diagnosed with JOSE since her last visit, awaiting a call back from sleep center for mask adjustment as she is having a difficult time using- admits cannot tolerate mask so has not used  - Medications for fibromyalgia only help 10-20% of the time and come with multiple side effects.  FDA recommended medications for fibromyalgia include: lyrica, cymbalta, and savella.  Other medications which have been used in fibromyalgia include amitriptyline, gabapentin, flexeril, gabapentin, and low dose naltresone.   - Exercise and a healthy life-style is important in management of this syndrome. We discussed this at length and I have recommended regular low impact exercise, preferably aquatic therapy, as well as cognitive/ behavioral therapy.    - Continue watching  diet and weight loss.    JOSE   - Enc continued use with CPAP but states unable to tolerate mask    Raynaud's Phenomenon   - Previously diagnosed with Raynaud's by previous PCP. Discussed symptomatic management. Keep hands warm.    - Asked patient to take images of skin color changes of her hands and bring to next appointment- enc to take pictures of sores that she notices on her finger tips as well- not noted on exam today- enc to take pictures and upload to portal     Nicotine Dependence    - Reports previously smoking 2 ppd and recently cut down to 0.5 ppd   - Encourage continued smoking cessation and congratulated her on her efforts as decreasing overall use! Keep working towards completed cessation, plans on starting patches to help her quit     HTN  - Currently elevated today, she reports she has a migraine today- did take meds as directed, continue to follow up with PCP, , encouraged her to monitor at home and if remains elevated follow-up with PCP, enc low Na+ diet.   - She was previously seeing Dr. Rodriguez but wishes referral to est care here, referral to Cardiology placed    Muscle weakness- to right leg- back pain/sciatica   - Aldolase and CPK  ok May 2024  - Reports right leg feels weak and heavy at times due to back pain shooting into left, advised to follow up with PCP for possible PT, possible MRI in the future if appropriate- she will continue to follow up with current PCP but wishes to est care here- referral to IM placed     CTS-  Right  - Reports previously diagnosed by PCP- states first 3 digits will get numb and tingle- uses her hands daily as she is a , reports worse at night, she does use night splints and helps, was previously getting injections in the past and were beneficial- Seen Ortho, just had [facility]- per report +, has call out to Ortho for follow up and hopefully schedule surgery in the future, enc use of night splints     Chronic bilateral foot pain /plantar fasciitis   - Flat feet bilaterally, body habitus and flat feet contributing to ankle pain- referred to Podiatry today as she also suffers with plantar fasciitis in the past, was previously getting injections but has not had in a while and would like to see Podiatry   - Referral sent over to [facility]-will check on status  - November 2023: X-ray of right foot showed punctate density along plantar aspect of interspace between the 1st and 2nd metatarsal may represent foreign body- denies FB that she is aware of.  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     Chronic pain of right hip  - Xrays May 2024 with minimal degenerative changes. unable to full assess hips due to body habitus  - Enc to continue exercise as tolerated along with diet for continued weight loss.     Vitamin D Def  - Repeat Vitamin D today    Family history thyroid disease  - Brother with Graves, unsure if thyroid issues contributing to her fatigue- TSH 9/2024 ok with PCP, will check TPO and thyroglobulin today, otherwise continue to follow up with PCP    History of Miscarriage  - Antiphospholipid panel today     No follow-ups on file. In addition to their scheduled follow up, the patient has also been instructed to follow up on as needed basis.       Total time spent with patient and documentation is 70 minutes. All questions were answered to patient's satisfaction and patient verbalized understanding. This includes face to face time and non-face to face time preparing to see the patient (eg,  review of tests), obtaining and/or reviewing separately obtained history, documenting clinical information in the electronic or other health record, independently interpreting results and communicating results to the patient/family/caregiver, or care coordinator. Today's visit included increased complexity associated with the care of episodic problem Fibromyalgia/Pain addressed and managing the longitudinal care of the patient due to the series and or complex managed problem(s).   An additional 3 minutes were spent discussing tobacco cessation.       MARIA L Valdes

## 2024-11-13 ENCOUNTER — LAB VISIT (OUTPATIENT)
Dept: LAB | Facility: HOSPITAL | Age: 34
End: 2024-11-13
Attending: NURSE PRACTITIONER
Payer: MEDICAID

## 2024-11-13 ENCOUNTER — OFFICE VISIT (OUTPATIENT)
Dept: RHEUMATOLOGY | Facility: CLINIC | Age: 34
End: 2024-11-13
Payer: MEDICAID

## 2024-11-13 VITALS
TEMPERATURE: 98 F | SYSTOLIC BLOOD PRESSURE: 160 MMHG | WEIGHT: 293 LBS | BODY MASS INDEX: 51.91 KG/M2 | HEIGHT: 63 IN | OXYGEN SATURATION: 98 % | HEART RATE: 94 BPM | DIASTOLIC BLOOD PRESSURE: 90 MMHG | RESPIRATION RATE: 20 BRPM

## 2024-11-13 DIAGNOSIS — R53.82 CHRONIC FATIGUE: ICD-10-CM

## 2024-11-13 DIAGNOSIS — M79.7 FIBROMYALGIA: Primary | ICD-10-CM

## 2024-11-13 DIAGNOSIS — G47.33 OSA ON CPAP: ICD-10-CM

## 2024-11-13 DIAGNOSIS — Z87.59 HISTORY OF MISCARRIAGE: ICD-10-CM

## 2024-11-13 DIAGNOSIS — Z76.89 ENCOUNTER TO ESTABLISH CARE: ICD-10-CM

## 2024-11-13 DIAGNOSIS — M72.2 PLANTAR FASCIITIS: ICD-10-CM

## 2024-11-13 DIAGNOSIS — M79.7 FIBROMYALGIA: ICD-10-CM

## 2024-11-13 DIAGNOSIS — I73.00 RAYNAUD'S PHENOMENON WITHOUT GANGRENE: ICD-10-CM

## 2024-11-13 DIAGNOSIS — E55.9 VITAMIN D DEFICIENCY: ICD-10-CM

## 2024-11-13 DIAGNOSIS — I10 PRIMARY HYPERTENSION: ICD-10-CM

## 2024-11-13 DIAGNOSIS — F17.210 CIGARETTE NICOTINE DEPENDENCE WITHOUT COMPLICATION: ICD-10-CM

## 2024-11-13 DIAGNOSIS — G56.01 CARPAL TUNNEL SYNDROME OF RIGHT WRIST: ICD-10-CM

## 2024-11-13 LAB
25(OH)D3+25(OH)D2 SERPL-MCNC: 24 NG/ML (ref 30–80)
ALBUMIN SERPL-MCNC: 3.7 G/DL (ref 3.5–5)
ALBUMIN/GLOB SERPL: 1 RATIO (ref 1.1–2)
ALP SERPL-CCNC: 75 UNIT/L (ref 40–150)
ALT SERPL-CCNC: 18 UNIT/L (ref 0–55)
ANION GAP SERPL CALC-SCNC: 9 MEQ/L
AST SERPL-CCNC: 17 UNIT/L (ref 5–34)
BASOPHILS # BLD AUTO: 0.03 X10(3)/MCL
BASOPHILS NFR BLD AUTO: 0.3 %
BILIRUB SERPL-MCNC: 0.3 MG/DL
BUN SERPL-MCNC: 13.6 MG/DL (ref 7–18.7)
CALCIUM SERPL-MCNC: 9.1 MG/DL (ref 8.4–10.2)
CHLORIDE SERPL-SCNC: 105 MMOL/L (ref 98–107)
CO2 SERPL-SCNC: 25 MMOL/L (ref 22–29)
CREAT SERPL-MCNC: 0.75 MG/DL (ref 0.55–1.02)
CREAT/UREA NIT SERPL: 18
CRP SERPL-MCNC: 24.4 MG/L
EOSINOPHIL # BLD AUTO: 0.14 X10(3)/MCL (ref 0–0.9)
EOSINOPHIL NFR BLD AUTO: 1.2 %
ERYTHROCYTE [DISTWIDTH] IN BLOOD BY AUTOMATED COUNT: 13.2 % (ref 11.5–17)
ERYTHROCYTE [SEDIMENTATION RATE] IN BLOOD: 26 MM/HR (ref 0–20)
GFR SERPLBLD CREATININE-BSD FMLA CKD-EPI: >60 ML/MIN/1.73/M2
GLOBULIN SER-MCNC: 3.6 GM/DL (ref 2.4–3.5)
GLUCOSE SERPL-MCNC: 90 MG/DL (ref 74–100)
HCT VFR BLD AUTO: 38.9 % (ref 37–47)
HGB BLD-MCNC: 12.5 G/DL (ref 12–16)
IMM GRANULOCYTES # BLD AUTO: 0.05 X10(3)/MCL (ref 0–0.04)
IMM GRANULOCYTES NFR BLD AUTO: 0.4 %
LYMPHOCYTES # BLD AUTO: 2.91 X10(3)/MCL (ref 0.6–4.6)
LYMPHOCYTES NFR BLD AUTO: 25.4 %
MCH RBC QN AUTO: 28 PG (ref 27–31)
MCHC RBC AUTO-ENTMCNC: 32.1 G/DL (ref 33–36)
MCV RBC AUTO: 87.2 FL (ref 80–94)
MONOCYTES # BLD AUTO: 0.57 X10(3)/MCL (ref 0.1–1.3)
MONOCYTES NFR BLD AUTO: 5 %
NEUTROPHILS # BLD AUTO: 7.76 X10(3)/MCL (ref 2.1–9.2)
NEUTROPHILS NFR BLD AUTO: 67.7 %
NRBC BLD AUTO-RTO: 0 %
PLATELET # BLD AUTO: 311 X10(3)/MCL (ref 130–400)
PMV BLD AUTO: 10 FL (ref 7.4–10.4)
POTASSIUM SERPL-SCNC: 3.9 MMOL/L (ref 3.5–5.1)
PROT SERPL-MCNC: 7.3 GM/DL (ref 6.4–8.3)
RBC # BLD AUTO: 4.46 X10(6)/MCL (ref 4.2–5.4)
SODIUM SERPL-SCNC: 139 MMOL/L (ref 136–145)
WBC # BLD AUTO: 11.46 X10(3)/MCL (ref 4.5–11.5)

## 2024-11-13 PROCEDURE — 4010F ACE/ARB THERAPY RXD/TAKEN: CPT | Mod: CPTII,,, | Performed by: NURSE PRACTITIONER

## 2024-11-13 PROCEDURE — 1159F MED LIST DOCD IN RCRD: CPT | Mod: CPTII,,, | Performed by: NURSE PRACTITIONER

## 2024-11-13 PROCEDURE — 85598 HEXAGNAL PHOSPH PLTLT NEUTRL: CPT

## 2024-11-13 PROCEDURE — 36415 COLL VENOUS BLD VENIPUNCTURE: CPT

## 2024-11-13 PROCEDURE — 86140 C-REACTIVE PROTEIN: CPT

## 2024-11-13 PROCEDURE — 85652 RBC SED RATE AUTOMATED: CPT

## 2024-11-13 PROCEDURE — 86147 CARDIOLIPIN ANTIBODY EA IG: CPT

## 2024-11-13 PROCEDURE — 86146 BETA-2 GLYCOPROTEIN ANTIBODY: CPT

## 2024-11-13 PROCEDURE — 3077F SYST BP >= 140 MM HG: CPT | Mod: CPTII,,, | Performed by: NURSE PRACTITIONER

## 2024-11-13 PROCEDURE — 85613 RUSSELL VIPER VENOM DILUTED: CPT

## 2024-11-13 PROCEDURE — 85025 COMPLETE CBC W/AUTO DIFF WBC: CPT

## 2024-11-13 PROCEDURE — 80053 COMPREHEN METABOLIC PANEL: CPT

## 2024-11-13 PROCEDURE — 85610 PROTHROMBIN TIME: CPT

## 2024-11-13 PROCEDURE — 82306 VITAMIN D 25 HYDROXY: CPT

## 2024-11-13 PROCEDURE — 3008F BODY MASS INDEX DOCD: CPT | Mod: CPTII,,, | Performed by: NURSE PRACTITIONER

## 2024-11-13 PROCEDURE — 99215 OFFICE O/P EST HI 40 MIN: CPT | Mod: PBBFAC | Performed by: NURSE PRACTITIONER

## 2024-11-13 PROCEDURE — 1160F RVW MEDS BY RX/DR IN RCRD: CPT | Mod: CPTII,,, | Performed by: NURSE PRACTITIONER

## 2024-11-13 PROCEDURE — 86800 THYROGLOBULIN ANTIBODY: CPT

## 2024-11-13 PROCEDURE — 86376 MICROSOMAL ANTIBODY EACH: CPT

## 2024-11-13 PROCEDURE — 3080F DIAST BP >= 90 MM HG: CPT | Mod: CPTII,,, | Performed by: NURSE PRACTITIONER

## 2024-11-13 PROCEDURE — 99215 OFFICE O/P EST HI 40 MIN: CPT | Mod: S$PBB,,, | Performed by: NURSE PRACTITIONER

## 2024-11-13 RX ORDER — SUMATRIPTAN 50 MG/1
50 TABLET, FILM COATED ORAL
COMMUNITY

## 2024-11-13 RX ORDER — ALBUTEROL SULFATE 5 MG/ML
2.5 SOLUTION RESPIRATORY (INHALATION) EVERY 6 HOURS PRN
COMMUNITY

## 2024-11-15 ENCOUNTER — PATIENT MESSAGE (OUTPATIENT)
Dept: RHEUMATOLOGY | Facility: CLINIC | Age: 34
End: 2024-11-15
Payer: MEDICAID

## 2024-11-15 LAB
THYROGLOB AB SERPL IA-ACNC: <1.8 IU/ML
THYROPEROXIDASE AB SERPL-ACNC: 1.1 IU/ML

## 2024-11-19 ENCOUNTER — PATIENT MESSAGE (OUTPATIENT)
Dept: RHEUMATOLOGY | Facility: CLINIC | Age: 34
End: 2024-11-19
Payer: MEDICAID

## 2024-11-19 DIAGNOSIS — E55.9 VITAMIN D DEFICIENCY: Primary | ICD-10-CM

## 2024-11-19 LAB — BEAKER SEE SCANNED REPORT: NORMAL
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
## 2024-11-20 ENCOUNTER — PATIENT MESSAGE (OUTPATIENT)
Dept: NEUROLOGY | Facility: CLINIC | Age: 34
End: 2024-11-20
Payer: MEDICAID

## 2024-11-20 ENCOUNTER — PATIENT MESSAGE (OUTPATIENT)
Dept: RHEUMATOLOGY | Facility: CLINIC | Age: 34
End: 2024-11-20
Payer: MEDICAID

## 2024-11-20 DIAGNOSIS — R53.82 CHRONIC FATIGUE: Primary | ICD-10-CM

## 2024-11-20 DIAGNOSIS — M79.671 CHRONIC PAIN OF BOTH FEET: ICD-10-CM

## 2024-11-20 DIAGNOSIS — G89.29 CHRONIC PAIN OF BOTH FEET: ICD-10-CM

## 2024-11-20 DIAGNOSIS — M79.672 CHRONIC PAIN OF BOTH FEET: ICD-10-CM

## 2024-11-20 RX ORDER — ERGOCALCIFEROL 1.25 MG/1
CAPSULE ORAL
Qty: 8 CAPSULE | Refills: 4 | Status: SHIPPED | OUTPATIENT
Start: 2024-11-20

## 2024-11-20 NOTE — TELEPHONE ENCOUNTER
Thanks for the update, yes, if she has had an apt with Podiatry already she can call to schedule f/u- thanks for uploading JOSE information for her to read and review as well, new rx for vitamin D sent over

## 2024-12-02 ENCOUNTER — PATIENT MESSAGE (OUTPATIENT)
Dept: NEUROLOGY | Facility: CLINIC | Age: 34
End: 2024-12-02

## 2024-12-02 ENCOUNTER — OFFICE VISIT (OUTPATIENT)
Dept: NEUROLOGY | Facility: CLINIC | Age: 34
End: 2024-12-02
Payer: MEDICAID

## 2024-12-02 VITALS
DIASTOLIC BLOOD PRESSURE: 61 MMHG | HEIGHT: 63 IN | BODY MASS INDEX: 51.91 KG/M2 | SYSTOLIC BLOOD PRESSURE: 123 MMHG | OXYGEN SATURATION: 100 % | WEIGHT: 293 LBS | HEART RATE: 82 BPM

## 2024-12-02 DIAGNOSIS — E66.813 CLASS 3 SEVERE OBESITY DUE TO EXCESS CALORIES WITH BODY MASS INDEX (BMI) OF 60.0 TO 69.9 IN ADULT, UNSPECIFIED WHETHER SERIOUS COMORBIDITY PRESENT: ICD-10-CM

## 2024-12-02 DIAGNOSIS — G43.111 MIGRAINE WITH AURA, INTRACTABLE, WITH STATUS MIGRAINOSUS: ICD-10-CM

## 2024-12-02 DIAGNOSIS — G47.33 OSA ON CPAP: ICD-10-CM

## 2024-12-02 DIAGNOSIS — E66.01 CLASS 3 SEVERE OBESITY DUE TO EXCESS CALORIES WITH BODY MASS INDEX (BMI) OF 60.0 TO 69.9 IN ADULT, UNSPECIFIED WHETHER SERIOUS COMORBIDITY PRESENT: ICD-10-CM

## 2024-12-02 DIAGNOSIS — G43.111 MIGRAINE WITH AURA, INTRACTABLE, WITH STATUS MIGRAINOSUS: Primary | Chronic | ICD-10-CM

## 2024-12-02 DIAGNOSIS — R11.0 NAUSEA: ICD-10-CM

## 2024-12-02 PROCEDURE — 99214 OFFICE O/P EST MOD 30 MIN: CPT | Mod: PBBFAC | Performed by: NURSE PRACTITIONER

## 2024-12-02 PROCEDURE — 3078F DIAST BP <80 MM HG: CPT | Mod: CPTII,,, | Performed by: NURSE PRACTITIONER

## 2024-12-02 PROCEDURE — 1160F RVW MEDS BY RX/DR IN RCRD: CPT | Mod: CPTII,,, | Performed by: NURSE PRACTITIONER

## 2024-12-02 PROCEDURE — 4010F ACE/ARB THERAPY RXD/TAKEN: CPT | Mod: CPTII,,, | Performed by: NURSE PRACTITIONER

## 2024-12-02 PROCEDURE — 3008F BODY MASS INDEX DOCD: CPT | Mod: CPTII,,, | Performed by: NURSE PRACTITIONER

## 2024-12-02 PROCEDURE — 99214 OFFICE O/P EST MOD 30 MIN: CPT | Mod: S$PBB,,, | Performed by: NURSE PRACTITIONER

## 2024-12-02 PROCEDURE — 3074F SYST BP LT 130 MM HG: CPT | Mod: CPTII,,, | Performed by: NURSE PRACTITIONER

## 2024-12-02 PROCEDURE — 1159F MED LIST DOCD IN RCRD: CPT | Mod: CPTII,,, | Performed by: NURSE PRACTITIONER

## 2024-12-02 RX ORDER — UBROGEPANT 100 MG/1
100 TABLET ORAL 2 TIMES DAILY PRN
Qty: 10 TABLET | Refills: 2 | Status: SHIPPED | OUTPATIENT
Start: 2024-12-02

## 2024-12-02 RX ORDER — FREMANEZUMAB-VFRM 225 MG/1.5ML
225 INJECTION SUBCUTANEOUS
Qty: 1 EACH | Refills: 5 | Status: SHIPPED | OUTPATIENT
Start: 2024-12-02

## 2024-12-02 RX ORDER — METOCLOPRAMIDE 10 MG/1
10 TABLET ORAL 3 TIMES DAILY
Qty: 30 TABLET | Refills: 2 | Status: SHIPPED | OUTPATIENT
Start: 2024-12-02

## 2024-12-02 RX ORDER — FREMANEZUMAB-VFRM 225 MG/1.5ML
225 INJECTION SUBCUTANEOUS
Qty: 1 EACH | Refills: 6 | OUTPATIENT
Start: 2024-12-02
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
## 2024-12-03 ENCOUNTER — PATIENT MESSAGE (OUTPATIENT)
Dept: ORTHOPEDICS | Facility: CLINIC | Age: 34
End: 2024-12-03
Payer: MEDICAID

## 2024-12-20 ENCOUNTER — TELEPHONE (OUTPATIENT)
Dept: RHEUMATOLOGY | Facility: CLINIC | Age: 34
End: 2024-12-20
Payer: MEDICAID

## 2024-12-20 NOTE — TELEPHONE ENCOUNTER
I ATTEMPT TO CALL PT NO ANSWER LEFT A  detailed VOICEMAIL MESSAGE TO  reminder that lab work is due CALL CLINIC BACK if any questions

## 2025-01-26 ENCOUNTER — PATIENT MESSAGE (OUTPATIENT)
Dept: RHEUMATOLOGY | Facility: CLINIC | Age: 35
End: 2025-01-26
Payer: MEDICAID

## 2025-01-27 ENCOUNTER — OFFICE VISIT (OUTPATIENT)
Dept: ORTHOPEDICS | Facility: CLINIC | Age: 35
End: 2025-01-27
Payer: MEDICAID

## 2025-01-27 ENCOUNTER — HOSPITAL ENCOUNTER (OUTPATIENT)
Dept: RADIOLOGY | Facility: HOSPITAL | Age: 35
Discharge: HOME | End: 2025-01-27
Attending: NURSE PRACTITIONER
Payer: MEDICAID

## 2025-01-27 VITALS
DIASTOLIC BLOOD PRESSURE: 80 MMHG | TEMPERATURE: 98 F | WEIGHT: 293 LBS | HEART RATE: 100 BPM | BODY MASS INDEX: 51.91 KG/M2 | SYSTOLIC BLOOD PRESSURE: 148 MMHG | HEIGHT: 63 IN

## 2025-01-27 DIAGNOSIS — G56.03 BILATERAL CARPAL TUNNEL SYNDROME: Primary | ICD-10-CM

## 2025-01-27 DIAGNOSIS — M79.642 LEFT HAND PAIN: ICD-10-CM

## 2025-01-27 DIAGNOSIS — M79.641 RIGHT HAND PAIN: ICD-10-CM

## 2025-01-27 PROCEDURE — 73130 X-RAY EXAM OF HAND: CPT | Mod: TC,RT

## 2025-01-27 PROCEDURE — 1159F MED LIST DOCD IN RCRD: CPT | Mod: CPTII,,, | Performed by: NURSE PRACTITIONER

## 2025-01-27 PROCEDURE — 99215 OFFICE O/P EST HI 40 MIN: CPT | Mod: PBBFAC,25 | Performed by: NURSE PRACTITIONER

## 2025-01-27 PROCEDURE — 3008F BODY MASS INDEX DOCD: CPT | Mod: CPTII,,, | Performed by: NURSE PRACTITIONER

## 2025-01-27 PROCEDURE — 4010F ACE/ARB THERAPY RXD/TAKEN: CPT | Mod: CPTII,,, | Performed by: NURSE PRACTITIONER

## 2025-01-27 PROCEDURE — 1160F RVW MEDS BY RX/DR IN RCRD: CPT | Mod: CPTII,,, | Performed by: NURSE PRACTITIONER

## 2025-01-27 PROCEDURE — 73130 X-RAY EXAM OF HAND: CPT | Mod: TC,LT

## 2025-01-27 PROCEDURE — 3077F SYST BP >= 140 MM HG: CPT | Mod: CPTII,,, | Performed by: NURSE PRACTITIONER

## 2025-01-27 PROCEDURE — 99214 OFFICE O/P EST MOD 30 MIN: CPT | Mod: S$PBB,,, | Performed by: NURSE PRACTITIONER

## 2025-01-27 PROCEDURE — 3079F DIAST BP 80-89 MM HG: CPT | Mod: CPTII,,, | Performed by: NURSE PRACTITIONER

## 2025-01-27 RX ORDER — TRIAMCINOLONE ACETONIDE 40 MG/ML
40 INJECTION, SUSPENSION INTRA-ARTICULAR; INTRAMUSCULAR
Status: DISCONTINUED | OUTPATIENT
Start: 2025-01-27 | End: 2025-01-27

## 2025-01-27 NOTE — PROGRESS NOTES
[facility] - Orthopedics & Sports Medicine Clinic  Subjective:   PATIENT ID: Mackenzie Church is a 34 y.o. female.   CHIEF COMPLAINT: Results of the Left Hand (Left hand EMG results. Left hand isnt in any pain today, Taking ibuprofen 800 mg for pain. No PT/No injections.  ) and Results of the Right Hand (Right hand EMG results. Right hand is in pain today, takes ibuprofen 800 mg for pain. No PT/No injections. )    HPI:  Bilateral L < R numbness, tingling in palm and of first 1-3 digits. 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    Onset: Several years  Worsening over time   Modifying Factors: exacerbated by:  increased activity, driving, lifting objects > 5 pounds, gripping improved with:  rest, shaking hand   Associated Symptoms:  [x] Weakness w/ dropping items  [] Weakness w/o dropping items  [x] Difficulty sleeping s/t symptoms [x] Radiating into forearm  [] Radiating into neck  [] Joint stiffness        Activity:  sedentary with light activity and pain moderately interferes with ADLs    Previous Treatments:  [x] HEP > 6 weeks  [x] Nocturnal wrist splint  [] RX OT   [x] OTC Pain Relievers: Tylenol, ibuprofen  [x] RX Medications: gabapentin  [x] CSI since years ago, last injection x2 years ago   PMH:  Fibromyalgia, Raynaud's. HTN   Family History: + OA   NOTE: Follow up, seen by me since 2024   for bilateral carpal tunnel.  CSI  given 2 yrs ago with without adequate relief.  Patient desires surgical treatment options due to failed conservative treatments.  Here today for EMG results.   Employment HX: [employer], currently employed.    History of abnormal EMG study.     Current Outpatient Medications:     albuterol (PROVENTIL) 5 mg/mL nebulizer solution, Take 2.5 mg by nebulization every 6 (six) hours as needed for Wheezing. Rescue, Disp: , Rfl:     cholecalciferol, vitamin D3, 1,250 mcg (50,000 unit) capsule, Take by  mouth., Disp: , Rfl:     ergocalciferol (ERGOCALCIFEROL) 50,000 unit Cap, Take 1 tab po twice a week, Disp: 8 capsule, Rfl: 4    fremanezumab-vfrm (AJOVY AUTOINJECTOR) 225 mg/1.5 mL autoinjector, Inject 1.5 mLs (225 mg total) into the skin every 28 days., Disp: 1 each, Rfl: 6    fremanezumab-vfrm (AJOVY AUTOINJECTOR) 225 mg/1.5 mL autoinjector, Inject 1.5 mLs (225 mg total) into the skin every 28 days., Disp: 1 each, Rfl: 5    gabapentin (NEURONTIN) 300 MG capsule, Take 300 mg by mouth 3 (three) times daily., Disp: , Rfl:     hydrALAZINE (APRESOLINE) 25 MG tablet, Take 25 mg by mouth every 8 (eight) hours as needed., Disp: , Rfl:     hydroCHLOROthiazide (HYDRODIURIL) 25 MG tablet, Take 25 mg by mouth once daily., Disp: , Rfl:     ibuprofen (ADVIL,MOTRIN) 800 MG tablet, Take 1 tablet (800 mg total) by mouth 2 (two) times a day., Disp: 60 tablet, Rfl: 1    levocetirizine (XYZAL) 5 MG tablet, Take 5 mg by mouth once daily., Disp: , Rfl:     lisinopriL 10 MG tablet, Take 10 mg by mouth once daily., Disp: , Rfl:     magnesium oxide (MAG-OX) 400 mg (241.3 mg magnesium) tablet, TAKE ONE TABLET BY MOUTH EVERY DAY, Disp: 30 tablet, Rfl: 2    metoclopramide HCl (REGLAN) 10 MG tablet, Take 1 tablet (10 mg total) by mouth 3 (three) times daily., Disp: 30 tablet, Rfl: 2    multivitamin (THERAGRAN) per tablet, Take 1 tablet by mouth once daily., Disp: , Rfl:     norgestimate-ethinyl estradioL (ORTHO TRI-CYCLEN LO) 0.18/0.215/0.25 mg-25 mcg tablet, Take 1 tablet by mouth once daily., Disp: , Rfl:     oxybutynin (DITROPAN) 5 MG Tab, Take 5 mg by mouth once daily., Disp: , Rfl:     RED BEET ROOT-SOUR CHERRY EXT ORAL, Take by mouth., Disp: , Rfl:     sumatriptan (IMITREX) 25 MG Tab, Take by mouth., Disp: , Rfl:     TAZTIA  mg Cs24, Take 240 mg by mouth once daily., Disp: , Rfl:     tiZANidine (ZANAFLEX) 4 MG tablet, Take 4 mg by mouth every evening. Takes PRN, Disp: , Rfl:     traZODone (DESYREL) 50 MG tablet, Take 50 mg by  "mouth every evening., Disp: , Rfl:     ubrogepant (UBRELVY) 100 mg tablet, Take 1 tablet (100 mg total) by mouth 2 (two) times daily as needed for Migraine. If symptoms persist or return, may repeat dose after 2 hours. Maximum: 200 mg per 24 hours, Disp: 10 tablet, Rfl: 2    valACYclovir (VALTREX) 1000 MG tablet, Take 1,000 mg by mouth 2 (two) times daily as needed., Disp: , Rfl:   Review of patient's allergies indicates:   Allergen Reactions    Robaxin [methocarbamol] Itching and Nausea And Vomiting     REVIEW OF SYSTEMS:  A ten-point review of systems was performed and is negative, except as mentioned above  Objective:   Body mass index is 60.94 kg/m².   Vitals:    01/27/25 1003   BP: (!) 148/80   Pulse: 100   Temp: 97.6 °F (36.4 °C)   TempSrc: Oral   Weight: (!) 156 kg (344 lb)   Height: 5' 3" (1.6 m)      MSK Hand/ Wrist Exam  General:  no apparent distress, no pain indicators,  obesity  Inspection:  LEFT HAND RIGHT HAND   no joint swelling, no mass/ cyst noted, no soft tissue swelling, no erythema, no lesions, no contusion, no gross deformities, no nodules, no atrophy of thenar or hypothenar eminence,  no scars, no discoloration noted no joint swelling, no mass/ cyst noted, no soft tissue swelling, no erythema, no lesions, no contusion, no gross deformities, no nodules, no atrophy of thenar or hypothenar eminence,  no scars, no discoloration noted   Palpation:     LEFT HAND RIGHT HAND   no joint tenderness, normal temperature, no palpable palm nodules, no finger joint tenderness or crepitus, no active triggering or locking of digits and no tenderness of digital flexor tendons, no tenderness of thenar and hypothenar eminence no joint tenderness, normal temperature, no palpable palm nodules, no finger joint tenderness or crepitus, no active triggering or locking of digits and no tenderness of digital flexor tendons, no tenderness of thenar and hypothenar eminence     ROM LEFT HAND RIGHT HAND   Wrist Flexion / " "Extension   80 / 75 80 / 75   Wrist Radial / Ulnar Deviation   15 / 30 15 / 30   Thumb CMC/ MCP/ IP WNL w/o pain WNL w/o pain   Finger MCP/PIP/DIP WNL w/o pain WNL w/o pain      Strength LEFT HAND RIGHT HAND    4 / 5 w/o pain 4 / 5 w/o pain       Special Testing: L+ L-- R+ R-- Not Tested     Carpal Tunnel Syndrome         Phalen [x] [] [x] [] []    Eder Carpal Compression [x] [] [x] [] []    Cubital Tunnel Syndrome         Tinel's Test @ elbow [] [x] [] [x] []    De Quervain's Tenosynovitis         Finkelstein Test [] [x] [] [x] []    Ligament Injury         Scaphoid Shift Test [] [] [] [] [x]    Lunotriquetral Shuck Test [] [] [] [] [x]    UCL Ligament Thumb Test [] [] [] [] [x]    Ulnar Fovea Sign [] [] [] [] [x]    Nerve Injury  Radial Nerve  IP joint extension against resistance intact   Median Nerve Palmar abduction of thumb intact   Anterior Interosseous Branch OK sign intact   Ulnar Nerve   Abduction of fingers against resistance intact, Froment's sign negative   Neuro/ Vascular: Spurling's Test negative, Intact to light touch, capillary refill 2 seconds,  radial pulse equal 2+, 2 point discrimination intact, Mundo's test intact  Psych: Awake, alert, oriented, normal mood and affect  Lymphatic: No LAD  Skin/ Soft Tissue: no rash, skin intact  Assessment:   IMAGING: XR Ordered by me today 3 views of bilateral hand reviewed and independently interpreted by me with no acute findings noted.  Awaiting radiologist findings..  Findings discussed with patient today.    Labs:  No results found for: "HGBA1C"   EMG Study: dated 10/30/24 per [facility]:  Findings are consistent with moderate to severe carpal tunnel syndrome on the right side.  On the left side findings are consistent with just moderate carpal tunnel syndrome.  EMR REVIEW: completed with noted prior visit 9/24/24 reviewed.  DX & Plan:   DIAGNOSIS: Moderate exacerbation of chronic Bilateral L < R Carpal tunnel syndrome severe right, moderate left  "   1. Bilateral carpal tunnel syndrome    2. BMI 60.0-69.9, adult       TREATMENT PLAN:  Orders Placed This Encounter    X-Ray Hand 3 view Left    X-Ray Hand 3 view Right     Treatment Plan: Patient will be referred to ortho res. for surgical eval. and treatment of carpal tunnel s/t failed conservative treatments.  Ongoing education about DX and treatment recommendations including conservative treatments of daily HEP for carpal tunnel, nocturnal splinting of wrist PRN and OTC NSAID as needed according to label instructions if able to tolerate.   Procedure: n/a  RX Medications: continue medications as RX per PCP .     RTC: next available appt. with ortho res.        Yamel Berkowitzsmayito [facility]  Procedure Note:   None  Time Based Billing   Total Time Spent with Patient: 30 minutes .  Visit Start Time: 1020  10 minutes spent prior to visit reviewing EMR, prior labs and x-rays.  10 minutes spent in visit with patient face-to-face time completing exam, obtaining history, educating on DX and treatment plan.  10 minutes spent after visit completing EMR documentation.   Visit End Time: 1050    Please be aware that this note has been generated with the assistance of MModal voice-to-text.  Please excuse any spelling or grammatical errors.

## 2025-02-03 ENCOUNTER — CLINICAL SUPPORT (OUTPATIENT)
Dept: ORTHOPEDICS | Facility: CLINIC | Age: 35
End: 2025-02-03
Payer: MEDICAID

## 2025-02-03 ENCOUNTER — LAB VISIT (OUTPATIENT)
Dept: LAB | Facility: HOSPITAL | Age: 35
End: 2025-02-03
Attending: NURSE PRACTITIONER
Payer: MEDICAID

## 2025-02-03 VITALS
HEART RATE: 90 BPM | BODY MASS INDEX: 51.91 KG/M2 | HEIGHT: 63 IN | DIASTOLIC BLOOD PRESSURE: 111 MMHG | WEIGHT: 293 LBS | TEMPERATURE: 98 F | SYSTOLIC BLOOD PRESSURE: 166 MMHG

## 2025-02-03 DIAGNOSIS — R53.82 CHRONIC FATIGUE: ICD-10-CM

## 2025-02-03 DIAGNOSIS — G56.03 BILATERAL CARPAL TUNNEL SYNDROME: Primary | ICD-10-CM

## 2025-02-03 DIAGNOSIS — M79.671 CHRONIC PAIN OF BOTH FEET: ICD-10-CM

## 2025-02-03 DIAGNOSIS — G89.29 CHRONIC PAIN OF BOTH FEET: ICD-10-CM

## 2025-02-03 DIAGNOSIS — M79.672 CHRONIC PAIN OF BOTH FEET: ICD-10-CM

## 2025-02-03 LAB
ALBUMIN SERPL-MCNC: 3.6 G/DL (ref 3.5–5)
ALBUMIN/GLOB SERPL: 1 RATIO (ref 1.1–2)
ALP SERPL-CCNC: 70 UNIT/L (ref 40–150)
ALT SERPL-CCNC: 9 UNIT/L (ref 0–55)
ANION GAP SERPL CALC-SCNC: 6 MEQ/L
AST SERPL-CCNC: 14 UNIT/L (ref 5–34)
BASOPHILS # BLD AUTO: 0.02 X10(3)/MCL
BASOPHILS NFR BLD AUTO: 0.3 %
BILIRUB SERPL-MCNC: 0.4 MG/DL
BUN SERPL-MCNC: 6.6 MG/DL (ref 7–18.7)
CALCIUM SERPL-MCNC: 9.2 MG/DL (ref 8.4–10.2)
CHLORIDE SERPL-SCNC: 108 MMOL/L (ref 98–107)
CO2 SERPL-SCNC: 26 MMOL/L (ref 22–29)
CREAT SERPL-MCNC: 0.73 MG/DL (ref 0.55–1.02)
CREAT/UREA NIT SERPL: 9
CRP SERPL-MCNC: 20.2 MG/L
EOSINOPHIL # BLD AUTO: 0.09 X10(3)/MCL (ref 0–0.9)
EOSINOPHIL NFR BLD AUTO: 1.3 %
ERYTHROCYTE [DISTWIDTH] IN BLOOD BY AUTOMATED COUNT: 13.7 % (ref 11.5–17)
ERYTHROCYTE [SEDIMENTATION RATE] IN BLOOD: 33 MM/HR (ref 0–15)
GFR SERPLBLD CREATININE-BSD FMLA CKD-EPI: >60 ML/MIN/1.73/M2
GLOBULIN SER-MCNC: 3.7 GM/DL (ref 2.4–3.5)
GLUCOSE SERPL-MCNC: 77 MG/DL (ref 74–100)
HCT VFR BLD AUTO: 38.5 % (ref 37–47)
HGB BLD-MCNC: 12 G/DL (ref 12–16)
IMM GRANULOCYTES # BLD AUTO: 0.02 X10(3)/MCL (ref 0–0.04)
IMM GRANULOCYTES NFR BLD AUTO: 0.3 %
LYMPHOCYTES # BLD AUTO: 2.15 X10(3)/MCL (ref 0.6–4.6)
LYMPHOCYTES NFR BLD AUTO: 31.9 %
MCH RBC QN AUTO: 27 PG (ref 27–31)
MCHC RBC AUTO-ENTMCNC: 31.2 G/DL (ref 33–36)
MCV RBC AUTO: 86.5 FL (ref 80–94)
MONOCYTES # BLD AUTO: 0.45 X10(3)/MCL (ref 0.1–1.3)
MONOCYTES NFR BLD AUTO: 6.7 %
NEUTROPHILS # BLD AUTO: 4.02 X10(3)/MCL (ref 2.1–9.2)
NEUTROPHILS NFR BLD AUTO: 59.5 %
NRBC BLD AUTO-RTO: 0 %
PLATELET # BLD AUTO: 301 X10(3)/MCL (ref 130–400)
PMV BLD AUTO: 10.1 FL (ref 7.4–10.4)
POTASSIUM SERPL-SCNC: 3.9 MMOL/L (ref 3.5–5.1)
PROT SERPL-MCNC: 7.3 GM/DL (ref 6.4–8.3)
RBC # BLD AUTO: 4.45 X10(6)/MCL (ref 4.2–5.4)
SODIUM SERPL-SCNC: 140 MMOL/L (ref 136–145)
WBC # BLD AUTO: 6.75 X10(3)/MCL (ref 4.5–11.5)

## 2025-02-03 PROCEDURE — 80053 COMPREHEN METABOLIC PANEL: CPT

## 2025-02-03 PROCEDURE — 36415 COLL VENOUS BLD VENIPUNCTURE: CPT

## 2025-02-03 PROCEDURE — 86140 C-REACTIVE PROTEIN: CPT

## 2025-02-03 PROCEDURE — 86038 ANTINUCLEAR ANTIBODIES: CPT

## 2025-02-03 PROCEDURE — 85025 COMPLETE CBC W/AUTO DIFF WBC: CPT

## 2025-02-03 PROCEDURE — 99214 OFFICE O/P EST MOD 30 MIN: CPT | Mod: S$PBB,GC,, | Performed by: ORTHOPAEDIC SURGERY

## 2025-02-03 PROCEDURE — 99215 OFFICE O/P EST HI 40 MIN: CPT | Mod: PBBFAC

## 2025-02-03 PROCEDURE — 85652 RBC SED RATE AUTOMATED: CPT

## 2025-02-03 RX ORDER — MUPIROCIN 20 MG/G
OINTMENT TOPICAL
Status: CANCELLED | OUTPATIENT
Start: 2025-02-03

## 2025-02-03 RX ORDER — SODIUM CHLORIDE 9 MG/ML
INJECTION, SOLUTION INTRAVENOUS CONTINUOUS
Status: CANCELLED | OUTPATIENT
Start: 2025-02-03

## 2025-02-03 NOTE — PROGRESS NOTES
Faculty Attestation: Mackenzie Church  was seen at [facility] in the Orthopaedic Clinic in the outpatient department at a [facility]. Patient seen and evaluated at the time of the visit.  I participated in the management of the patient and was immediately available throughout the encounter. History of Present Illness, Physical Exam, and Assessment and Plan reviewed. Treatment plan is reasonable and appropriate. Compliance with treatment recommendations is important. No procedure was performed.  Has symptoms of cubital tunnel syndrome as well despite negative EMG.  She has a positive Tinel's at the elbow.  She also with flexion of the elbow gets numbness on the ulnar-sided digits.  I agree with proceeding with carpal tunnel release and ulnar nerve release or transposition.  Her left side has some mild symptoms.  I advised her to start wearing a brace at night to possibly prevent the need for surgery on that side in the future.    Shravan River MD  Orthopedic Surgery Chief

## 2025-02-03 NOTE — PROGRESS NOTES
[facility]  New Patient Office Visit  02/03/2025       Patient ID: Mackenzie Church  YOB: 1990  MRN: [id number]    Diagnosis:    The encounter diagnosis was Bilateral carpal tunnel syndrome.     Chief Complaint: Follow-up of the Left Hand (Left hand CTS. Patient states her left hand is in pain but its not constant, left hand is constantly numb. Takes ibuprofen for pain as needed, does at home exercises for left wrist. No recent injections in left wrist. Wears brace for left wrist at night for support. ) and Follow-up of the Right Hand (Right hand CTS. Patient states her right wrist is in pain, the pain in right wrist is worse than the left. Right hand is numb constantly and locking up also. Takes ibuprofen for pain as needed, does at home exercises for right wrist. No recent injections in right wrist. Wears brace for right wrist at night for support. )      Mackenzie Church is a 34 y.o. female who presents as a new patient for treatment of the above mentioned diagnosis.  Patient is right-hand dominant works as a .  She has history of lupus, RA, fibromyalgia, hypertension, asthma current smoker.  She is presenting for bilateral hand pain.  Right is worse than left.  Single row for many years.  Really worsening over the last year.  She has tried night bracing and injections that have not provided her with relief.  Says it wakes her up at night.  She has a shakes her hand out.  He has numbness tingling into the whole hand.        Past Medical History:    Past Medical History:   Diagnosis Date    Carpal tunnel syndrome     Endometriosis, unspecified     Hiatal hernia     HTN (hypertension)     Legal blindness     Migraine     Neuropathy     PCOS (polycystic ovarian syndrome)     Sciatica      Past Surgical History:   Procedure Laterality Date    REATTACHMENT OF FINGER Right     second and third digits    TONSILLECTOMY, ADENOIDECTOMY       Family History   Problem Relation  Name Age of Onset    Fibromyalgia Mother      Lupus Mother      HIV Mother      Bipolar disorder Mother      Lung cancer Father      Lupus Sister      Graves' disease Brother      Prostate cancer Paternal Uncle      Stomach cancer Paternal Grandmother       Social History     Socioeconomic History    Marital status: Single   Tobacco Use    Smoking status: Every Day     Current packs/day: 0.50     Average packs/day: 0.5 packs/day for 20.0 years (10.0 ttl pk-yrs)     Types: Cigarettes, Vaping with nicotine    Smokeless tobacco: Never    Tobacco comments:     Working on quitting, went from 2 packs daily to 1/2 pack daily   Substance and Sexual Activity    Alcohol use: Not Currently     Comment: socially    Drug use: Never    Sexual activity: Not Currently     Birth control/protection: OCP     Medication List with Changes/Refills   Current Medications    ALBUTEROL (PROVENTIL) 5 MG/ML NEBULIZER SOLUTION    Take 2.5 mg by nebulization every 6 (six) hours as needed for Wheezing. Rescue    CHOLECALCIFEROL, VITAMIN D3, 1,250 MCG (50,000 UNIT) CAPSULE    Take by mouth.    ERGOCALCIFEROL (ERGOCALCIFEROL) 50,000 UNIT CAP    Take 1 tab po twice a week    FREMANEZUMAB-VFRM (AJOVY AUTOINJECTOR) 225 MG/1.5 ML AUTOINJECTOR    Inject 1.5 mLs (225 mg total) into the skin every 28 days.    FREMANEZUMAB-VFRM (AJOVY AUTOINJECTOR) 225 MG/1.5 ML AUTOINJECTOR    Inject 1.5 mLs (225 mg total) into the skin every 28 days.    GABAPENTIN (NEURONTIN) 300 MG CAPSULE    Take 300 mg by mouth 3 (three) times daily.    HYDRALAZINE (APRESOLINE) 25 MG TABLET    Take 25 mg by mouth every 8 (eight) hours as needed.    HYDROCHLOROTHIAZIDE (HYDRODIURIL) 25 MG TABLET    Take 25 mg by mouth once daily.    IBUPROFEN (ADVIL,MOTRIN) 800 MG TABLET    Take 1 tablet (800 mg total) by mouth 2 (two) times a day.    LEVOCETIRIZINE (XYZAL) 5 MG TABLET    Take 5 mg by mouth once daily.    LISINOPRIL 10 MG TABLET    Take 10 mg by mouth once daily.    MAGNESIUM OXIDE  (MAG-OX) 400 MG (241.3 MG MAGNESIUM) TABLET    TAKE ONE TABLET BY MOUTH EVERY DAY    METOCLOPRAMIDE HCL (REGLAN) 10 MG TABLET    Take 1 tablet (10 mg total) by mouth 3 (three) times daily.    MULTIVITAMIN (THERAGRAN) PER TABLET    Take 1 tablet by mouth once daily.    NORGESTIMATE-ETHINYL ESTRADIOL (ORTHO TRI-CYCLEN LO) 0.18/0.215/0.25 MG-25 MCG TABLET    Take 1 tablet by mouth once daily.    OXYBUTYNIN (DITROPAN) 5 MG TAB    Take 5 mg by mouth once daily.    RED BEET ROOT-SOUR CHERRY EXT ORAL    Take by mouth.    SUMATRIPTAN (IMITREX) 25 MG TAB    Take by mouth.    TAZTIA  MG CS24    Take 240 mg by mouth once daily.    TIZANIDINE (ZANAFLEX) 4 MG TABLET    Take 4 mg by mouth every evening. Takes PRN    TRAZODONE (DESYREL) 50 MG TABLET    Take 50 mg by mouth every evening.    UBROGEPANT (UBRELVY) 100 MG TABLET    Take 1 tablet (100 mg total) by mouth 2 (two) times daily as needed for Migraine. If symptoms persist or return, may repeat dose after 2 hours. Maximum: 200 mg per 24 hours    VALACYCLOVIR (VALTREX) 1000 MG TABLET    Take 1,000 mg by mouth 2 (two) times daily as needed.     Review of patient's allergies indicates:   Allergen Reactions    Robaxin [methocarbamol] Itching and Nausea And Vomiting       ROS:    Body mass index is 61.29 kg/m².  GENERAL: Well appearing, appropriate for stated age, no acute distress.  CARDIOVASCULAR: Pulses regular by peripheral palpation.  PULMONARY: Respirations are even and non-labored.  NEURO: Awake, alert, and oriented x 3.  PSYCH: Mood & affect are appropriate.  HEENT: Head is normocephalic and atraumatic.    Physical Exam:    Right upper extremity   No wounds or abrasions   Positive Tinel's, Durkan's, Phalen's at the wrist   Positive Tinel's and elbow flexion test of the elbow   Motor intact  [place]   Negative Finkelstein   No significant tenderness over the A1 pulleys of all digits   Hand WWP    Left upper extremity   No wounds or abrasions   Mildly positive Phalen's at  the wrist, negative Tinel's and Durkan's   Negative Tinel's and elbow flexion test at the elbow   Motor intact   [place]   Negative Finkelstein   No significant tenderness over A1 pulleys of all digits   Hand warm well perfused    Imaging:    Previous x-rays of bilateral hands demonstrate no fractures or dislocations.    EMG 11/26/2024 showed moderate to severe carpal tunnel on the right and moderate carpal tunnel on the left    Assessment and Plan:    Mackenzie Church is a 34 y.o. female seen in the office today for bilateral carpal tunnel, right greater than left, and right cubital tunnel syndrome,     Risks, benefits, alternatives discussed with the patient.  Risks including but limited to infection, bleeding, damage to surrounding structures, wound complications, continued symptoms, need for further surgery were discussed.  Patient would like to proceed with right carpal tunnel and cubital tunnel release.  Although her previous EMG did not mention the ulnar nerve at the elbow she is symptomatic and EMG can be incorrect about 10% of the time.  He will schedule her for 02/20/2025 with Dr. River.  She is going to discuss with her neurologist the current medications she is on and let us know about timing for surgery.      Shady Chaudhry

## 2025-02-06 ENCOUNTER — PATIENT MESSAGE (OUTPATIENT)
Dept: RHEUMATOLOGY | Facility: CLINIC | Age: 35
End: 2025-02-06
Payer: MEDICAID

## 2025-02-06 LAB — BEAKER SEE SCANNED REPORT: NORMAL

## 2025-02-07 ENCOUNTER — ANESTHESIA EVENT (OUTPATIENT)
Dept: SURGERY | Facility: HOSPITAL | Age: 35
End: 2025-02-07
Payer: MEDICAID

## 2025-02-07 NOTE — ANESTHESIA PREPROCEDURE EVALUATION
"Mackenzie Church is a 34 y.o. female PRESENTING FOR RELEASE, CARPAL TUNNEL (Right: Hand)       RELEASE, CUBITAL TUNNEL (Right) with a history of   -Bilateral carpal tunnel syndrome   -HTN--was following Cards. Now f/u PRN  -ASTHMA  -JOSE ON CPAP (reported unable to tolerate 11/2024)  -FIBROMYALGIA  -SEVERE MORBID OBESITY, BMI 61  -SMOKES 1/2 PPD; VAPES  -MIGRAINES (on AJOVY monthly)      BETA-BLOCKER: none    New Orders for Anesthesia: UPT      Patient Active Problem List   Diagnosis    Sciatica    Primary hypertension    Class 3 severe obesity due to excess calories with body mass index (BMI) of 60.0 to 69.9 in adult    Migraine with aura, intractable, with status migrainosus    Asthma    Hiatal hernia    Bilateral carpal tunnel syndrome    Neuropathy    Smoking    Fibromyalgia    Cigarette nicotine dependence without complication    Raynaud's phenomenon without gangrene    Anemia    Plantar fasciitis    Vitamin D deficiency    JOSE on CPAP    BMI 60.0-69.9, adult       Pre-op Assessment    I have reviewed the NPO Status.      Review of Systems  Anesthesia Hx:  No problems with previous Anesthesia                Social:  Smoker, Vaping       Cardiovascular:     Hypertension, well controlled                                          Pulmonary:    Asthma mild   Sleep Apnea, CPAP                Renal/:  Renal/ Normal                 Hepatic/GI:  Hepatic/GI Normal                    Neurological:  Neurology Normal                                      Endocrine:        Morbid Obesity / BMI > 40    Vitals:    02/20/25 0745 02/20/25 0806   BP:  (!) 143/86   Pulse:  85   Resp:  20   Temp:  36.4 °C (97.6 °F)   TempSrc:  Oral   SpO2:  99%   Weight: (!) 150 kg (330 lb 12.8 oz)    Height: 5' 3" (1.6 m)          Physical Exam  General: Alert, Cooperative and Well nourished    Airway:  Mallampati: II   Mouth Opening: Normal  TM Distance: Normal  Tongue: Normal  Neck ROM: Normal ROM    Dental:  Intact    Chest/Lungs:  Clear to " auscultation, Normal Respiratory Rate    Heart:  Rate: Normal  Rhythm: Regular Rhythm  Sounds: Normal       Latest Reference Range & Units 02/20/25 08:13   hCG Qualitative, Urine Negative  Negative     Lab Results   Component Value Date    WBC 6.75 02/03/2025    HGB 12.0 02/03/2025    HCT 38.5 02/03/2025    MCV 86.5 02/03/2025     02/03/2025       CMP  Sodium   Date Value Ref Range Status   02/03/2025 140 136 - 145 mmol/L Final     Potassium   Date Value Ref Range Status   02/03/2025 3.9 3.5 - 5.1 mmol/L Final     Chloride   Date Value Ref Range Status   02/03/2025 108 (H) 98 - 107 mmol/L Final     CO2   Date Value Ref Range Status   02/03/2025 26 22 - 29 mmol/L Final     Blood Urea Nitrogen   Date Value Ref Range Status   02/03/2025 6.6 (L) 7.0 - 18.7 mg/dL Final     Creatinine   Date Value Ref Range Status   02/03/2025 0.73 0.55 - 1.02 mg/dL Final     Calcium   Date Value Ref Range Status   02/03/2025 9.2 8.4 - 10.2 mg/dL Final     Albumin   Date Value Ref Range Status   02/03/2025 3.6 3.5 - 5.0 g/dL Final     Bilirubin Total   Date Value Ref Range Status   02/03/2025 0.4 <=1.5 mg/dL Final     ALP   Date Value Ref Range Status   02/03/2025 70 40 - 150 unit/L Final     AST   Date Value Ref Range Status   02/03/2025 14 5 - 34 unit/L Final     ALT   Date Value Ref Range Status   02/03/2025 9 0 - 55 unit/L Final     eGFR   Date Value Ref Range Status   02/03/2025 >60 mL/min/1.73/m2 Final     Comment:     Estimated GFR calculated using the CKD-EPI creatinine (2021) equation.         CARDS OV 11/6/23        Anesthesia Plan  Type of Anesthesia, risks & benefits discussed:    Anesthesia Type: MAC, Regional  Intra-op Monitoring Plan: Standard ASA Monitors  Post Op Pain Control Plan: IV/PO Opioids PRN  Induction:  IV  Informed Consent: Informed consent signed with the Patient and all parties understand the risks and agree with anesthesia plan.  All questions answered.   ASA Score: 3  Day of Surgery Review of History  & Physical: H&P Update referred to the surgeon/provider.    Ready For Surgery From Anesthesia Perspective.     .

## 2025-02-19 NOTE — H&P
Interval H&P    Patient seen and examined in preop holding area. No changes to history or exam other than noted below.    To OR today for right carpal tunnel release and right cubital tunnel release with Dr. River.  ------------------------------------  [facility]  New Patient Office Visit  02/03/2025         Patient ID: Mackenzie Church  YOB: 1990  MRN: [id number]     Diagnosis:     The encounter diagnosis was Bilateral carpal tunnel syndrome.      Chief Complaint: Follow-up of the Left Hand (Left hand CTS. Patient states her left hand is in pain but its not constant, left hand is constantly numb. Takes ibuprofen for pain as needed, does at home exercises for left wrist. No recent injections in left wrist. Wears brace for left wrist at night for support. ) and Follow-up of the Right Hand (Right hand CTS. Patient states her right wrist is in pain, the pain in right wrist is worse than the left. Right hand is numb constantly and locking up also. Takes ibuprofen for pain as needed, does at home exercises for right wrist. No recent injections in right wrist. Wears brace for right wrist at night for support. )        Mackenzie Church is a 34 y.o. female who presents as a new patient for treatment of the above mentioned diagnosis.  Patient is right-hand dominant works as a .  She has history of lupus, RA, fibromyalgia, hypertension, asthma current smoker.  She is presenting for bilateral hand pain.  Right is worse than left.  Single row for many years.  Really worsening over the last year.  She has tried night bracing and injections that have not provided her with relief.  Says it wakes her up at night.  She has a shakes her hand out.  He has numbness tingling into the whole hand.           Past Medical History:          Past Medical History:   Diagnosis Date    Carpal tunnel syndrome      Endometriosis, unspecified      Hiatal hernia      HTN (hypertension)      Legal  blindness      Migraine      Neuropathy      PCOS (polycystic ovarian syndrome)      Sciatica              Past Surgical History:   Procedure Laterality Date    REATTACHMENT OF FINGER Right       second and third digits    TONSILLECTOMY, ADENOIDECTOMY                 Family History   Problem Relation Name Age of Onset    Fibromyalgia Mother        Lupus Mother        HIV Mother        Bipolar disorder Mother        Lung cancer Father        Lupus Sister        Graves' disease Brother        Prostate cancer Paternal Uncle        Stomach cancer Paternal Grandmother          Social History            Socioeconomic History    Marital status: Single   Tobacco Use    Smoking status: Every Day       Current packs/day: 0.50       Average packs/day: 0.5 packs/day for 20.0 years (10.0 ttl pk-yrs)       Types: Cigarettes, Vaping with nicotine    Smokeless tobacco: Never    Tobacco comments:       Working on quitting, went from 2 packs daily to 1/2 pack daily   Substance and Sexual Activity    Alcohol use: Not Currently       Comment: socially    Drug use: Never    Sexual activity: Not Currently       Birth control/protection: OCP           Medication List with Changes/Refills   Current Medications     ALBUTEROL (PROVENTIL) 5 MG/ML NEBULIZER SOLUTION    Take 2.5 mg by nebulization every 6 (six) hours as needed for Wheezing. Rescue     CHOLECALCIFEROL, VITAMIN D3, 1,250 MCG (50,000 UNIT) CAPSULE    Take by mouth.     ERGOCALCIFEROL (ERGOCALCIFEROL) 50,000 UNIT CAP    Take 1 tab po twice a week     FREMANEZUMAB-VFRM (AJOVY AUTOINJECTOR) 225 MG/1.5 ML AUTOINJECTOR    Inject 1.5 mLs (225 mg total) into the skin every 28 days.     FREMANEZUMAB-VFRM (AJOVY AUTOINJECTOR) 225 MG/1.5 ML AUTOINJECTOR    Inject 1.5 mLs (225 mg total) into the skin every 28 days.     GABAPENTIN (NEURONTIN) 300 MG CAPSULE    Take 300 mg by mouth 3 (three) times daily.     HYDRALAZINE (APRESOLINE) 25 MG TABLET    Take 25 mg by mouth every 8 (eight) hours as  needed.     HYDROCHLOROTHIAZIDE (HYDRODIURIL) 25 MG TABLET    Take 25 mg by mouth once daily.     IBUPROFEN (ADVIL,MOTRIN) 800 MG TABLET    Take 1 tablet (800 mg total) by mouth 2 (two) times a day.     LEVOCETIRIZINE (XYZAL) 5 MG TABLET    Take 5 mg by mouth once daily.     LISINOPRIL 10 MG TABLET    Take 10 mg by mouth once daily.     MAGNESIUM OXIDE (MAG-OX) 400 MG (241.3 MG MAGNESIUM) TABLET    TAKE ONE TABLET BY MOUTH EVERY DAY     METOCLOPRAMIDE HCL (REGLAN) 10 MG TABLET    Take 1 tablet (10 mg total) by mouth 3 (three) times daily.     MULTIVITAMIN (THERAGRAN) PER TABLET    Take 1 tablet by mouth once daily.     NORGESTIMATE-ETHINYL ESTRADIOL (ORTHO TRI-CYCLEN LO) 0.18/0.215/0.25 MG-25 MCG TABLET    Take 1 tablet by mouth once daily.     OXYBUTYNIN (DITROPAN) 5 MG TAB    Take 5 mg by mouth once daily.     RED BEET ROOT-SOUR CHERRY EXT ORAL    Take by mouth.     SUMATRIPTAN (IMITREX) 25 MG TAB    Take by mouth.     TAZTIA  MG CS24    Take 240 mg by mouth once daily.     TIZANIDINE (ZANAFLEX) 4 MG TABLET    Take 4 mg by mouth every evening. Takes PRN     TRAZODONE (DESYREL) 50 MG TABLET    Take 50 mg by mouth every evening.     UBROGEPANT (UBRELVY) 100 MG TABLET    Take 1 tablet (100 mg total) by mouth 2 (two) times daily as needed for Migraine. If symptoms persist or return, may repeat dose after 2 hours. Maximum: 200 mg per 24 hours     VALACYCLOVIR (VALTREX) 1000 MG TABLET    Take 1,000 mg by mouth 2 (two) times daily as needed.           Review of patient's allergies indicates:   Allergen Reactions    Robaxin [methocarbamol] Itching and Nausea And Vomiting         ROS:     Body mass index is 61.29 kg/m².  GENERAL: Well appearing, appropriate for stated age, no acute distress.  CARDIOVASCULAR: Pulses regular by peripheral palpation.  PULMONARY: Respirations are even and non-labored.  NEURO: Awake, alert, and oriented x 3.  PSYCH: Mood & affect are appropriate.  HEENT: Head is normocephalic and  atraumatic.     Physical Exam:     Right upper extremity   No wounds or abrasions   Positive Tinel's, Durkan's, Phalen's at the wrist   Positive Tinel's and elbow flexion test of the elbow   Motor intact  [place]   Negative Finkelstein   No significant tenderness over the A1 pulleys of all digits   Hand WWP     Left upper extremity   No wounds or abrasions   Mildly positive Phalen's at the wrist, negative Tinel's and Durkan's   Negative Tinel's and elbow flexion test at the elbow   Motor intact   [place]   Negative Finkelstein   No significant tenderness over A1 pulleys of all digits   Hand warm well perfused     Imaging:     Previous x-rays of bilateral hands demonstrate no fractures or dislocations.     EMG 11/26/2024 showed moderate to severe carpal tunnel on the right and moderate carpal tunnel on the left     Assessment and Plan:     Mackenzie Church is a 34 y.o. female seen in the office today for bilateral carpal tunnel, right greater than left, and right cubital tunnel syndrome,      Risks, benefits, alternatives discussed with the patient.  Risks including but limited to infection, bleeding, damage to surrounding structures, wound complications, continued symptoms, need for further surgery were discussed.  Patient would like to proceed with right carpal tunnel and cubital tunnel release.  Although her previous EMG did not mention the ulnar nerve at the elbow she is symptomatic and EMG can be incorrect about 10% of the time.  He will schedule her for 02/20/2025 with Dr. River.  She is going to discuss with her neurologist the current medications she is on and let us know about timing for surgery.

## 2025-02-19 NOTE — DISCHARGE SUMMARY
[facility]  Brief Operative Note    Surgery Date:  2/20/2025    Surgeon(s) and Role:     Shravan River MD - Primary    Assisting Surgeon: Pavel Landa MD    Pre-op Diagnosis:  Right carpal tunnel syndrome; right cubital tunnel syndrome    Post-op Diagnosis:    Same as preop    Procedure(s) (LRB):  RELEASE, CARPAL TUNNEL (Right)  RELEASE, CUBITAL TUNNEL (Right)    Anesthesia: Choice    Operative Findings:  Entrapped median nerve at the level of the right carpal tunnel; entrapped ulnar nerve at the level of the cubital tunnel    Estimated Blood Loss: Minimal         Specimens:   Specimen (24h ago, onward)      None              Discharge Note    OUTCOME: Patient tolerated treatment/procedure well without complication and is now ready for discharge.    DISPOSITION: Home or Self Care    FINAL DIAGNOSIS:  Right carpal tunnel syndrome; right cubital tunnel syndrome    FOLLOWUP: In clinic    DISCHARGE INSTRUCTIONS:  Keep dressing clean, dry, intact. Take prescribed medications as needed for pain. No weight bearing through the operative extremity.  Sling for comfort.

## 2025-02-19 NOTE — DISCHARGE INSTRUCTIONS
· Keep follow up appointment on [facility].    · Take pain medication as prescribed. See attached med sheet.    · Keep arm elevated on pillow.    · No driving or consuming alcohol for the next 24 hours or while taking narcotic pain medicine.    · May apply ice pack to surgical area for 20 minutes at a time 6-8 times per day.    · Dressing: Leave clean, dry, and intact until follow-up appointment. Use sling until block wears off and for comfort as needed.    · NO LIFTING OR PULLING WITH AFFECTED HAND until cleared by MD.  Start wiggling fingers and making a fist GENTLY today once block has worn off.    · Notify MD of any moderate to severe pain unrelieved by pain medicine or for any signs of infection including fever above 100.4, excessive redness or swelling, yellow/green foul- smelling drainage, nausea or vomiting. Call clinic at: [phone]. After business hours, if your concern is an emergency, call 911 or report to your nearest emergency room.    · Thanks for choosing [facility]! Have a smooth recovery!

## 2025-02-20 ENCOUNTER — ANESTHESIA (OUTPATIENT)
Dept: SURGERY | Facility: HOSPITAL | Age: 35
End: 2025-02-20
Payer: MEDICAID

## 2025-02-20 ENCOUNTER — HOSPITAL ENCOUNTER (OUTPATIENT)
Facility: HOSPITAL | Age: 35
Discharge: HOME | End: 2025-02-20
Attending: ORTHOPAEDIC SURGERY | Admitting: ORTHOPAEDIC SURGERY
Payer: MEDICAID

## 2025-02-20 DIAGNOSIS — G56.03 BILATERAL CARPAL TUNNEL SYNDROME: ICD-10-CM

## 2025-02-20 LAB
B-HCG UR QL: NEGATIVE
CTP QC/QA: YES

## 2025-02-20 PROCEDURE — 25000003 PHARM REV CODE 250

## 2025-02-20 PROCEDURE — 63600175 PHARM REV CODE 636 W HCPCS

## 2025-02-20 PROCEDURE — 63600175 PHARM REV CODE 636 W HCPCS: Performed by: ORTHOPAEDIC SURGERY

## 2025-02-20 PROCEDURE — 76942 ECHO GUIDE FOR BIOPSY: CPT | Performed by: ANESTHESIOLOGY

## 2025-02-20 PROCEDURE — 64718 REVISE ULNAR NERVE AT ELBOW: CPT | Mod: RT,,, | Performed by: ORTHOPAEDIC SURGERY

## 2025-02-20 PROCEDURE — 63600175 PHARM REV CODE 636 W HCPCS: Performed by: ANESTHESIOLOGY

## 2025-02-20 PROCEDURE — 37000008 HC ANESTHESIA 1ST 15 MINUTES: Performed by: ORTHOPAEDIC SURGERY

## 2025-02-20 PROCEDURE — 71000016 HC POSTOP RECOV ADDL HR: Performed by: ORTHOPAEDIC SURGERY

## 2025-02-20 PROCEDURE — 36000707: Performed by: ORTHOPAEDIC SURGERY

## 2025-02-20 PROCEDURE — 36000706: Performed by: ORTHOPAEDIC SURGERY

## 2025-02-20 PROCEDURE — 81025 URINE PREGNANCY TEST: CPT | Performed by: NURSE PRACTITIONER

## 2025-02-20 PROCEDURE — 64721 CARPAL TUNNEL SURGERY: CPT | Mod: 51,RT,, | Performed by: ORTHOPAEDIC SURGERY

## 2025-02-20 PROCEDURE — 37000009 HC ANESTHESIA EA ADD 15 MINS: Performed by: ORTHOPAEDIC SURGERY

## 2025-02-20 PROCEDURE — 25000003 PHARM REV CODE 250: Performed by: ANESTHESIOLOGY

## 2025-02-20 PROCEDURE — 71000015 HC POSTOP RECOV 1ST HR: Performed by: ORTHOPAEDIC SURGERY

## 2025-02-20 RX ORDER — GABAPENTIN 300 MG/1
300 CAPSULE ORAL 3 TIMES DAILY
Qty: 90 CAPSULE | Refills: 0 | Status: SHIPPED | OUTPATIENT
Start: 2025-02-20 | End: 2025-03-22

## 2025-02-20 RX ORDER — ACETAMINOPHEN 500 MG
500 TABLET ORAL EVERY 6 HOURS PRN
Qty: 30 TABLET | Refills: 0 | Status: SHIPPED | OUTPATIENT
Start: 2025-02-20

## 2025-02-20 RX ORDER — OXYCODONE HYDROCHLORIDE 5 MG/1
5 TABLET ORAL EVERY 4 HOURS PRN
Qty: 30 TABLET | Refills: 0 | Status: SHIPPED | OUTPATIENT
Start: 2025-02-20 | End: 2025-02-27

## 2025-02-20 RX ORDER — KETAMINE HCL IN 0.9 % NACL 50 MG/5 ML
SYRINGE (ML) INTRAVENOUS
Status: DISCONTINUED | OUTPATIENT
Start: 2025-02-20 | End: 2025-02-20

## 2025-02-20 RX ORDER — DEXMEDETOMIDINE HYDROCHLORIDE 100 UG/ML
INJECTION, SOLUTION INTRAVENOUS
Status: DISCONTINUED | OUTPATIENT
Start: 2025-02-20 | End: 2025-02-20

## 2025-02-20 RX ORDER — ROPIVACAINE HYDROCHLORIDE 5 MG/ML
INJECTION, SOLUTION EPIDURAL; INFILTRATION; PERINEURAL
Status: COMPLETED | OUTPATIENT
Start: 2025-02-20 | End: 2025-02-20

## 2025-02-20 RX ORDER — METOPROLOL TARTRATE 1 MG/ML
INJECTION, SOLUTION INTRAVENOUS
Status: DISCONTINUED | OUTPATIENT
Start: 2025-02-20 | End: 2025-02-20

## 2025-02-20 RX ORDER — CEFAZOLIN SODIUM 1 G/3ML
3 INJECTION, POWDER, FOR SOLUTION INTRAMUSCULAR; INTRAVENOUS
Status: COMPLETED | OUTPATIENT
Start: 2025-02-20 | End: 2025-02-20

## 2025-02-20 RX ORDER — LIDOCAINE HYDROCHLORIDE 10 MG/ML
INJECTION, SOLUTION EPIDURAL; INFILTRATION; INTRACAUDAL; PERINEURAL
Status: DISCONTINUED | OUTPATIENT
Start: 2025-02-20 | End: 2025-02-20 | Stop reason: HOSPADM

## 2025-02-20 RX ORDER — SODIUM CHLORIDE 9 MG/ML
INJECTION, SOLUTION INTRAVENOUS CONTINUOUS
Status: DISCONTINUED | OUTPATIENT
Start: 2025-02-20 | End: 2025-02-20 | Stop reason: HOSPADM

## 2025-02-20 RX ORDER — IBUPROFEN 800 MG/1
800 TABLET ORAL EVERY 6 HOURS PRN
Qty: 30 TABLET | Refills: 0 | Status: SHIPPED | OUTPATIENT
Start: 2025-02-20

## 2025-02-20 RX ORDER — MIDAZOLAM HYDROCHLORIDE 1 MG/ML
INJECTION INTRAMUSCULAR; INTRAVENOUS
Status: DISCONTINUED | OUTPATIENT
Start: 2025-02-20 | End: 2025-02-20

## 2025-02-20 RX ORDER — SODIUM CHLORIDE, SODIUM LACTATE, POTASSIUM CHLORIDE, CALCIUM CHLORIDE 600; 310; 30; 20 MG/100ML; MG/100ML; MG/100ML; MG/100ML
INJECTION, SOLUTION INTRAVENOUS CONTINUOUS
Status: DISCONTINUED | OUTPATIENT
Start: 2025-02-20 | End: 2025-02-20 | Stop reason: HOSPADM

## 2025-02-20 RX ORDER — PROPOFOL 10 MG/ML
INJECTION, EMULSION INTRAVENOUS
Status: DISCONTINUED | OUTPATIENT
Start: 2025-02-20 | End: 2025-02-20

## 2025-02-20 RX ORDER — PROPOFOL 10 MG/ML
VIAL (ML) INTRAVENOUS CONTINUOUS PRN
Status: DISCONTINUED | OUTPATIENT
Start: 2025-02-20 | End: 2025-02-20

## 2025-02-20 RX ORDER — ACETAMINOPHEN 500 MG
1000 TABLET ORAL ONCE
Status: COMPLETED | OUTPATIENT
Start: 2025-02-20 | End: 2025-02-20

## 2025-02-20 RX ORDER — FENTANYL CITRATE 50 UG/ML
INJECTION, SOLUTION INTRAMUSCULAR; INTRAVENOUS
Status: DISCONTINUED | OUTPATIENT
Start: 2025-02-20 | End: 2025-02-20

## 2025-02-20 RX ORDER — MUPIROCIN 20 MG/G
OINTMENT TOPICAL
Status: DISCONTINUED | OUTPATIENT
Start: 2025-02-20 | End: 2025-02-20 | Stop reason: HOSPADM

## 2025-02-20 RX ORDER — MIDAZOLAM HYDROCHLORIDE 2 MG/2ML
5 INJECTION, SOLUTION INTRAMUSCULAR; INTRAVENOUS ONCE AS NEEDED
Status: COMPLETED | OUTPATIENT
Start: 2025-02-20 | End: 2025-02-20

## 2025-02-20 RX ADMIN — ACETAMINOPHEN 1000 MG: 500 TABLET ORAL at 01:02

## 2025-02-20 RX ADMIN — DEXMEDETOMIDINE 8 MCG: 200 INJECTION, SOLUTION INTRAVENOUS at 10:02

## 2025-02-20 RX ADMIN — PROPOFOL 180 MCG/KG/MIN: 10 INJECTION, EMULSION INTRAVENOUS at 11:02

## 2025-02-20 RX ADMIN — FENTANYL CITRATE 50 MCG: 50 INJECTION INTRAMUSCULAR; INTRAVENOUS at 10:02

## 2025-02-20 RX ADMIN — Medication 10 MG: at 10:02

## 2025-02-20 RX ADMIN — MIDAZOLAM HYDROCHLORIDE 4 MG: 1 INJECTION, SOLUTION INTRAMUSCULAR; INTRAVENOUS at 08:02

## 2025-02-20 RX ADMIN — MIDAZOLAM HYDROCHLORIDE 2 MG: 1 INJECTION, SOLUTION INTRAMUSCULAR; INTRAVENOUS at 10:02

## 2025-02-20 RX ADMIN — Medication 20 MG: at 10:02

## 2025-02-20 RX ADMIN — Medication 40 MG: at 10:02

## 2025-02-20 RX ADMIN — DEXMEDETOMIDINE 16 MCG: 200 INJECTION, SOLUTION INTRAVENOUS at 10:02

## 2025-02-20 RX ADMIN — PROPOFOL 40 MG: 10 INJECTION, EMULSION INTRAVENOUS at 10:02

## 2025-02-20 RX ADMIN — CEFAZOLIN 3 G: 330 INJECTION, POWDER, FOR SOLUTION INTRAMUSCULAR; INTRAVENOUS at 10:02

## 2025-02-20 RX ADMIN — METOPROLOL TARTRATE 2 MG: 1 INJECTION, SOLUTION INTRAVENOUS at 10:02

## 2025-02-20 RX ADMIN — ROPIVACAINE HYDROCHLORIDE 30 ML: 5 INJECTION, SOLUTION EPIDURAL; INFILTRATION; PERINEURAL at 09:02

## 2025-02-20 RX ADMIN — Medication 30 MG: at 11:02

## 2025-02-20 NOTE — ANESTHESIA POSTPROCEDURE EVALUATION
Anesthesia Post Evaluation    Patient: Mackenzie Church    Procedure(s) Performed: Procedure(s) (LRB):  RELEASE, CARPAL TUNNEL (Right)  RELEASE, CUBITAL TUNNEL (Right)    Final Anesthesia Type: general      Patient location during evaluation: OPS  Patient participation: Yes- Able to Participate  Level of consciousness: awake and alert  Post-procedure vital signs: reviewed and stable  Pain management: adequate  Airway patency: patent    PONV status at discharge: No PONV  Anesthetic complications: no      Cardiovascular status: hemodynamically stable  Respiratory status: unassisted and room air  Hydration status: euvolemic  Follow-up not needed.              Vitals Value Taken Time   /86 02/20/25 08:06   Temp 36.4 °C (97.6 °F) 02/20/25 08:06   Pulse 85 02/20/25 08:06   Resp 20 02/20/25 08:06   SpO2 99 % 02/20/25 08:06

## 2025-02-20 NOTE — OP NOTE
Operative Note     Date of Service: 02/20/2025     Pre-Operative Diagnosis:  Right carpal tunnel syndrome; right cubital tunnel syndrome    Post-Operative Diagnosis: Same     Procedure(s):   1. Right carpal tunnel release; right cubital tunnel release     Anesthesia: General     Surgeon: Shravan River MD, was present and scrubbed for the key portions of the procedure.     Assistant(s):   Pavel Landa MD; Shady Chaudhry MD    Estimated blood loss:  Minimal     Drains:  None     Total IV fluids: Per anesthesia records     Complications: None    Specimens:  None     Implant: * No implants in log *     Findings:  Entrapped median nerve at the level of the carpal tunnel; entrapped ulnar nerve at the level of the cubital tunnel     Indications   Patient is a 34 y.o.female with symptoms of right cubital tunnel syndrome and right carpal tunnel syndrome. The patient was checked again in the Holding Room. The risks, benefits, complications, treatment options, and expected outcomes were reviewed again with the patient. The patient has elected to proceed with right cubital tunnel release; right carpal tunnel release. The risks and potential complications include but are not limited to infection, nerve injury, vascular injury, persistent pain, potential skin necrosis, deep vein thrombosis, possible pulmonary embolus, complications of the anesthetics, nonunion, malunion and need for future surgery. The patient concurred with the proposed plan, giving informed consent. The site of surgery was identified by the patient and properly noted/marked by me.       Procedure Details:   The patient was brought to the operating room, positioned supine on the stretcher with a hand table.  An anesthetic was administered by our anesthesia colleagues.  A nonsterile tourniquet was placed.  The hand was prepped and draped in the usual fashion.  A preoperative pause was performed to confirm the site and side for surgery.  Preoperative antibiotics  were given.      Proximally 7 cc of 1% lidocaine without epinephrine was used to locally anesthetize the area over the carpal tunnel.  A 3-4 cm incision was placed in line with the flexed 4th digit over the carpal tunnel. Meticulous hemostasis was obtained using bipolar.  Dissection was taken down with use of 15 blade through skin, palmar fascia all the way down to the transverse carpal ligament. A potential space was opened up above the level of the carpal tunnel with the use of scissors with proximally and distally. The transverse carpal ligament was released in its entirety using combination of 15 blade and tenotomy scissors.   [place] was used to confirm adequate release.  The wound was thoroughly irrigated.  Interrupted 4-0 vicryl rapide was used to close skin.      Next attention was turned to the cubital tunnel.  An incision was made MCC between the medial epicondyle and the tip of the olecranon.  We sharply incised through skin and spread through subcutaneous tissue.  The nerve was identified proximally including the arcade of Zebulon and the Baumann's ligament. These were both released over the nerve.  We then traced the nerve distally between the 2 heads of FCU ensuring that the fascia was fully released over the nerve and was free of compression.  I inspected the nerve and there did appear to be nerve compression at the level of Baumann ligament.  There was no abnormal contents of the cubital tunnel.  I took the elbow through range of motion and there was no nerve subluxation.  Thus we decided not to transpose the ulnar nerve.  The wound was copiously irrigated.  The wound was closed with 3-0 Monocryl and Vicryl Rapide suture.  Sterile dressing Xeroform, 4x4s, cast paddin, and Ace was applied.        Instrument, sponge, and needle counts were correct prior to wound closure and at the conclusion of the case.   The patient was awoken from anesthesia, tolerated the procedure well and taken to recovery in  stable condition.     Disposition: Awakened from anesthesia, and taken to the recovery room in a stable condition, having suffered no apparent untoward event     Condition: Stable     Post-Operative Management   Keep dressing clean, dry, intact. Take prescribed medications as needed for pain. No weight bearing through the operative extremity. Sling for comfort.    Pavel Landa MD  [employer]opa[facility]

## 2025-02-20 NOTE — TRANSFER OF CARE
Anesthesia Transfer of Care Note    Patient: Mackenzie Church    Procedure(s) Performed: Procedure(s) (LRB):  RELEASE, CARPAL TUNNEL (Right)  RELEASE, CUBITAL TUNNEL (Right)    Patient location: PACU    Anesthesia Type: general    Transport from OR: Transported from OR on room air with adequate spontaneous ventilation    Post pain: adequate analgesia    Post assessment: no apparent anesthetic complications    Post vital signs: stable    Level of consciousness: awake and alert    Nausea/Vomiting: no nausea/vomiting    Complications: none    Transfer of care protocol was followed

## 2025-02-20 NOTE — ANESTHESIA PROCEDURE NOTES
Peripheral Block    Patient location during procedure: pre-op    Reason for block: primary anesthetic    Diagnosis: Rt CTR, CuTR   Start time: 2/20/2025 9:03 AM  Timeout: 2/20/2025 9:03 AM   End time: 2/20/2025 9:08 AM    Staffing  Authorizing Provider: Марина Turcios MD  Performing Provider: Марина Turcios MD    Staffing  Performed by: Марина Turcios MD  Authorized by: Марина Turcios MD    Preanesthetic Checklist  Completed: patient identified, IV checked, site marked, risks and benefits discussed, surgical consent, monitors and equipment checked, pre-op evaluation and timeout performed  Peripheral Block  Patient position: sitting  Prep: ChloraPrep  Patient monitoring: heart rate, cardiac monitor, continuous pulse ox, continuous capnometry and frequent blood pressure checks  Block type: infraclavicular  Laterality: right  Injection technique: single shot  Needle  Needle type: Stimuplex   Needle gauge: 21 G  Needle length: 4 in  Needle localization: ultrasound guidance and anatomical landmarks   -ultrasound image captured on disc.  Assessment  Injection assessment: negative aspiration and negative parasthesia  Paresthesia pain: none  Heart rate change: no  Slow fractionated injection: yes  Pain Tolerance: comfortable throughout block and no complaints  Medications:    Medications: ropivacaine (NAROPIN) injection 0.5% - Perineural   30 mL - 2/20/2025 9:04:00 AM    Additional Notes  VSS.  [facility] RN monitoring vitals throughout procedure.  Patient tolerated procedure well.

## 2025-02-21 ENCOUNTER — TELEPHONE (OUTPATIENT)
Dept: ORTHOPEDICS | Facility: CLINIC | Age: 35
End: 2025-02-21
Payer: MEDICAID

## 2025-02-21 VITALS
HEART RATE: 84 BPM | TEMPERATURE: 98 F | DIASTOLIC BLOOD PRESSURE: 92 MMHG | OXYGEN SATURATION: 100 % | RESPIRATION RATE: 20 BRPM | SYSTOLIC BLOOD PRESSURE: 143 MMHG | WEIGHT: 293 LBS | BODY MASS INDEX: 51.91 KG/M2 | HEIGHT: 63 IN

## 2025-02-21 DIAGNOSIS — G43.111 MIGRAINE WITH AURA, INTRACTABLE, WITH STATUS MIGRAINOSUS: ICD-10-CM

## 2025-02-21 DIAGNOSIS — G56.03 BILATERAL CARPAL TUNNEL SYNDROME: Primary | ICD-10-CM

## 2025-02-21 NOTE — TELEPHONE ENCOUNTER
Patient is requesting tramadol for pain she states that she does not want to take a narcotics cause her parents are both addicted to them and she don't want to get that way

## 2025-02-24 RX ORDER — LANOLIN ALCOHOL/MO/W.PET/CERES
1 CREAM (GRAM) TOPICAL
Qty: 30 TABLET | Refills: 1 | Status: SHIPPED | OUTPATIENT
Start: 2025-02-24

## 2025-02-24 RX ORDER — TRAMADOL HYDROCHLORIDE 50 MG/1
50 TABLET ORAL EVERY 6 HOURS
Qty: 12 TABLET | Refills: 0 | Status: SHIPPED | OUTPATIENT
Start: 2025-02-24 | End: 2025-02-27

## 2025-02-24 NOTE — TELEPHONE ENCOUNTER
Patient called back I let her know that the medication was sent to her pharmacy for pain.  She also let me know that her incision felt warm and she wanted to know what to do if it gets worst.      I told her she would need to notify us as soon as she can and to watch for drainage and increase swelling and pain and if she has increase fever.

## 2025-03-05 ENCOUNTER — OFFICE VISIT (OUTPATIENT)
Dept: ORTHOPEDICS | Facility: CLINIC | Age: 35
End: 2025-03-05
Payer: MEDICAID

## 2025-03-05 VITALS
BODY MASS INDEX: 51.91 KG/M2 | TEMPERATURE: 98 F | SYSTOLIC BLOOD PRESSURE: 148 MMHG | OXYGEN SATURATION: 97 % | DIASTOLIC BLOOD PRESSURE: 89 MMHG | RESPIRATION RATE: 17 BRPM | HEART RATE: 111 BPM | HEIGHT: 63 IN | WEIGHT: 293 LBS

## 2025-03-05 DIAGNOSIS — G56.03 BILATERAL CARPAL TUNNEL SYNDROME: Primary | ICD-10-CM

## 2025-03-05 PROCEDURE — 4010F ACE/ARB THERAPY RXD/TAKEN: CPT | Mod: CPTII,,, | Performed by: SPECIALIST

## 2025-03-05 PROCEDURE — 3080F DIAST BP >= 90 MM HG: CPT | Mod: CPTII,,, | Performed by: SPECIALIST

## 2025-03-05 PROCEDURE — 99215 OFFICE O/P EST HI 40 MIN: CPT | Mod: PBBFAC

## 2025-03-05 PROCEDURE — 99024 POSTOP FOLLOW-UP VISIT: CPT | Mod: ,,, | Performed by: SPECIALIST

## 2025-03-05 PROCEDURE — 3077F SYST BP >= 140 MM HG: CPT | Mod: CPTII,,, | Performed by: SPECIALIST

## 2025-03-05 PROCEDURE — 1159F MED LIST DOCD IN RCRD: CPT | Mod: CPTII,,, | Performed by: SPECIALIST

## 2025-03-05 NOTE — PROGRESS NOTES
[facility]  New Patient Office Visit  03/05/2025       Patient ID: Mackenzie Church  YOB: 1990  MRN: [id number]    Diagnosis:    Bilateral carpal tunnel syndrome    Chief Complaint: Post-op Evaluation of the Right Wrist      Mackenzie Church is a 34 y.o. female who is 2 weeks status post right carpal tunnel decompression at the wrist and right ulnar nerve decompression in the cubital tunnel.  Patient states she is doing well.  She has not been taking any pain medicines.  She states that she had immediate relief of numbness and tingling in all the digits of her hand.  Eager to get sutures removed.  Occasionally complains of a shocking pain in her elbow.      Past Medical History:    Past Medical History:   Diagnosis Date    Amenorrhea     Anemia     Carpal tunnel syndrome     Dyspareunia     Endometriosis, unspecified     Fibroid     Hiatal hernia     HTN (hypertension)     Infertility, female     Legal blindness     Migraine     Neuropathy     PCOS (polycystic ovarian syndrome)     Sciatica      Past Surgical History:   Procedure Laterality Date    CARPAL TUNNEL RELEASE Right 2/20/2025    Procedure: RELEASE, CARPAL TUNNEL;  Surgeon: Shravan River MD;  Location: [facility];  Service: Orthopedics;  Laterality: Right;    REATTACHMENT OF FINGER Right     second and third digits    TONSILLECTOMY, ADENOIDECTOMY      ULNAR TUNNEL RELEASE Right 2/20/2025    Procedure: RELEASE, CUBITAL TUNNEL;  Surgeon: Shravan River MD;  Location: [facility];  Service: Orthopedics;  Laterality: Right;     Family History   Problem Relation Name Age of Onset    Fibromyalgia Mother Isabel Ramirez     Lupus Mother Isabel Ramirez     HIV Mother Isabel Ramirez     Bipolar disorder Mother Isabel Ramirez     Asthma Mother Isabel Ramirez     Hypertension Mother Isabel Ramirez     Arthritis Mother Isabel Ramirez     Depression Mother Isabel Ramirez     Drug abuse Mother Isabel Ramirez     Kidney disease  Mother Isabel Ramirez     Mental illness Mother Isabel Ramirez     Miscarriages / Stillbirths Mother Isabel Ramirez     Lung cancer Father Fernie Church Sr.     Asthma Father Fernie Ranjit Sr.     Cancer Father Fernie Ranjit Sr.     Arthritis Father Fernie Ranjit Sr.     Drug abuse Father Fernie Ranjit Sr.     Hearing loss Father Fernie Ranjit Sr.     Mental illness Father Fernie Ranjit Sr.     Lupus Sister Carmel Grady     Hypertension Sister Carmel Grady     Arthritis Sister Carmel Grady     Depression Sister Carmel Grady     Kidney disease Sister Carmel Grady     Mental illness Sister Carmel Grady     Graves' disease Brother Fernie Ranjit Jr     Arthritis Brother eFrnie Church Jr     Mental illness Brother Fernie Church Jr     Prostate cancer Paternal Uncle      Stomach cancer Paternal Grandmother Jenifer Ralph Church     Asthma Paternal Grandmother Jenifer Ralph Church     Diabetes Paternal Grandmother Jenifer Ralph Ranjit     Heart disease Paternal Grandmother Jenifer Ralph Church     Cancer Maternal Grandfather Shravan Mckinnono     Rheum arthritis Maternal Grandmother Ruth Lilli     Asthma Maternal Grandmother Ruth Lilli     Cancer Maternal Grandmother Ruth Lilli     Diabetes Maternal Grandmother Ruth Lilli     Stroke Maternal Grandmother Ruth Lilli     COPD Maternal Grandmother Ruth Lilli     Depression Maternal Grandmother Ruth Lilli     Heart disease Maternal Grandmother Ruth Lilli     Cancer Paternal Grandfather Ivan Ranjit     Heart disease Paternal Grandfather Ivan Ranjit     Cancer Maternal Uncle Major Ramirez     Alcohol abuse Maternal Uncle Major Ashley     Cancer Paternal Uncle lAlen Church     Alcohol abuse Paternal Uncle Allen Church     Diabetes Maternal Uncle Baltazar Ramirez     Mental illness Maternal Uncle Baltazar Ramirez     Vision loss Maternal Uncle Baltazar Ramirez     Diabetes Maternal Uncle Osvaldo Ramirez     Kidney disease Sister Carline Ashley Moon     Mental  illness Sister Carline Moon     Miscarriages / Stillbirths Sister Carline Moon     Learning disabilities Maternal Aunt Padmini Ramirez     Mental illness Maternal Aunt Padmini Ramirez     Learning disabilities Brother Demarcus Ramirez     Learning disabilities Maternal Cousin Alisia Ramirez     Learning disabilities Maternal Cousin Afua Ramirez     Learning disabilities Maternal Cousin Isabel Ramirez     Vaginal cancer Maternal Aunt Nisha Jang          of servical cancer     Social History     Socioeconomic History    Marital status: Single   Tobacco Use    Smoking status: Every Day     Current packs/day: 0.50     Average packs/day: 0.5 packs/day for 20.0 years (10.0 ttl pk-yrs)     Types: Cigarettes    Smokeless tobacco: Never    Tobacco comments:     Working on quitting, went from 2 packs daily to 1/2 pack daily   Substance and Sexual Activity    Alcohol use: Not Currently     Comment: socially    Drug use: Never    Sexual activity: Not Currently     Partners: Male     Birth control/protection: Condom     Medication List with Changes/Refills   Current Medications    ACETAMINOPHEN (TYLENOL) 500 MG TABLET    Take 1 tablet (500 mg total) by mouth every 6 (six) hours as needed for Pain.    ALBUTEROL (PROVENTIL) 5 MG/ML NEBULIZER SOLUTION    Take 2.5 mg by nebulization every 6 (six) hours as needed for Wheezing. Rescue    CHOLECALCIFEROL, VITAMIN D3, 1,250 MCG (50,000 UNIT) CAPSULE    Take by mouth.    FREMANEZUMAB-VFRM (AJOVY AUTOINJECTOR) 225 MG/1.5 ML AUTOINJECTOR    Inject 1.5 mLs (225 mg total) into the skin every 28 days.    FREMANEZUMAB-VFRM (AJOVY AUTOINJECTOR) 225 MG/1.5 ML AUTOINJECTOR    Inject 1.5 mLs (225 mg total) into the skin every 28 days.    GABAPENTIN (NEURONTIN) 300 MG CAPSULE    Take 300 mg by mouth 3 (three) times daily.    GABAPENTIN (NEURONTIN) 300 MG CAPSULE    Take 1 capsule (300 mg total) by mouth 3 (three) times daily.    HYDRALAZINE (APRESOLINE) 25 MG TABLET     Take 25 mg by mouth every 8 (eight) hours as needed.    HYDROCHLOROTHIAZIDE (HYDRODIURIL) 25 MG TABLET    Take 25 mg by mouth once daily.    IBUPROFEN (ADVIL,MOTRIN) 800 MG TABLET    Take 1 tablet (800 mg total) by mouth every 6 (six) hours as needed for Pain.    LEVOCETIRIZINE (XYZAL) 5 MG TABLET    Take 5 mg by mouth once daily.    LISINOPRIL 10 MG TABLET    Take 10 mg by mouth once daily.    MAGNESIUM OXIDE (MAG-OX) 400 MG (241.3 MG MAGNESIUM) TABLET    TAKE ONE TABLET BY MOUTH EVERY DAY    METOCLOPRAMIDE HCL (REGLAN) 10 MG TABLET    Take 1 tablet (10 mg total) by mouth 3 (three) times daily.    MULTIVITAMIN (THERAGRAN) PER TABLET    Take 1 tablet by mouth once daily.    NORGESTIMATE-ETHINYL ESTRADIOL (ORTHO TRI-CYCLEN LO) 0.18/0.215/0.25 MG-25 MCG TABLET    Take 1 tablet by mouth once daily.    OXYBUTYNIN (DITROPAN) 5 MG TAB    Take 5 mg by mouth once daily.    RED BEET ROOT-SOUR CHERRY EXT ORAL    Take by mouth.    SUMATRIPTAN (IMITREX) 25 MG TAB    Take by mouth.    TAZTIA  MG CS24    Take 240 mg by mouth once daily.    TIZANIDINE (ZANAFLEX) 4 MG TABLET    Take 4 mg by mouth every evening. Takes PRN    TRAZODONE (DESYREL) 50 MG TABLET    Take 50 mg by mouth every evening.    UBROGEPANT (UBRELVY) 100 MG TABLET    Take 1 tablet (100 mg total) by mouth 2 (two) times daily as needed for Migraine. If symptoms persist or return, may repeat dose after 2 hours. Maximum: 200 mg per 24 hours    VALACYCLOVIR (VALTREX) 1000 MG TABLET    Take 1,000 mg by mouth 2 (two) times daily as needed.     Review of patient's allergies indicates:   Allergen Reactions    Robaxin [methocarbamol] Itching and Nausea And Vomiting       ROS:    Body mass index is 60.81 kg/m².  GENERAL: Well appearing, appropriate for stated age, no acute distress.  CARDIOVASCULAR: Pulses regular by peripheral palpation.  PULMONARY: Respirations are even and non-labored.  NEURO: Awake, alert, and oriented x 3.  PSYCH: Mood & affect are  appropriate.  HEENT: Head is normocephalic and atraumatic.    Physical Exam:    Right upper extremity   Surgical incisions at the carpal tunnel and the cubital tunnel are well approximated with suture in place  No erythema, drainage, evidence of wound dehiscence  Motor intact ain/pin/U/M/R  Sensation intact to light touch M/U/R  2+ radial pulse, cap refill less than 2 seconds      Imaging:  New imaging obtained today    Assessment and Plan:    Mackenzie Church is a 34 y.o. female seen in the office today for bilateral carpal tunnel, right greater than left, and right cubital tunnel syndrome -- 2 weeks status post right carpal and cubital tunnel release on 02/20/2025    We will remove sutures in office today  We will place Steri-Strips  Instructed the patient that she can get incision wet but should not soak  No heavy lifting  Can initiate scar massage once wounds are completely healed  Encouraged range of motion of elbow and wrist  Return to clinic in 6 weeks for repeat evaluation    Pavel Landa

## 2025-03-05 NOTE — LETTER
[facility]   [address] [address]  Phone: [phone]  Fax: [phone]    Name:Mackenzie Church  :1990   Date:25      PATIENT IS ABLE TO RETURN TO WORK AS OF:3/5/25    [] SEDENTARY WORK: Lifting 10 pounds maximum and occasionally lifting and/or carrying articles such as dockers, ledgers and small tools.  Although a sedentary job is defined as one which involved sitting, a certain amount of walking and standing are required only occasionally and other sedentary criteria are met.    [] LIGHT WORK: Lifting 20 pounds with frequent lifting and/or carrying objects weighing up to 10 pounds.  Even though the weight lifted may be only a negotiable amount, a job is in the category when it involves sitting most of the time with a degree of pushing/pulling of arm and/or leg controls.    [] MEDIUM WORK: Lifting of 50 pounds maximum with frequent lifting and/or carrying of objects up to 25 pounds.    [] HEAVY WORK: Lifting of 100 pounds maximum with frequent lifting and/or carrying objects up to 50 pounds.    [] VERY HEAVY WORK: Lifting objects in excess of 100 pounds with frequent lifting and/or carrying of objects weighing 50 pounds or more.    [] REGULAR DUTY: [] No Restrictions. [x] With Restrictions (See comments below0:    COMMENTS:  No lifting pulling or carrying greater than 5 lb.  Must keep wounds covered at all times for 4 weeks from date of surgery.  No washing dishes.     Pavel Landa MD

## 2025-03-05 NOTE — PROGRESS NOTES
Faculty Attestation: Mackenzie Church  was seen at [facility] in the Orthopaedic Clinic. Discussed with the resident at the time of the visit. History of Present Illness, Physical Exam, and Assessment and Plan reviewed. Treatment plan is reasonable and appropriate. Compliance with treatment recommendations is important. No procedure was performed.     Garry Goins MD  Orthopaedic Surgery

## 2025-03-18 ENCOUNTER — TELEPHONE (OUTPATIENT)
Dept: OBSTETRICS AND GYNECOLOGY | Facility: CLINIC | Age: 35
End: 2025-03-18
Payer: MEDICAID

## 2025-03-18 NOTE — TELEPHONE ENCOUNTER
----- Message from Jenny sent at 3/18/2025  9:55 AM CDT -----  Regarding: CALL BACK  Pt called saying she cannot schedule her pre-op appt at [facility] because she's not showing she has a sx scheduled. Would like someone to call her back.

## 2025-03-24 ENCOUNTER — RESULTS FOLLOW-UP (OUTPATIENT)
Dept: OBSTETRICS AND GYNECOLOGY | Facility: CLINIC | Age: 35
End: 2025-03-24
Payer: MEDICAID

## 2025-03-27 ENCOUNTER — TELEPHONE (OUTPATIENT)
Dept: NEUROLOGY | Facility: CLINIC | Age: 35
End: 2025-03-27
Payer: MEDICAID

## 2025-03-31 ENCOUNTER — PATIENT MESSAGE (OUTPATIENT)
Dept: NEUROLOGY | Facility: CLINIC | Age: 35
End: 2025-03-31

## 2025-03-31 ENCOUNTER — OFFICE VISIT (OUTPATIENT)
Dept: NEUROLOGY | Facility: CLINIC | Age: 35
End: 2025-03-31
Payer: MEDICAID

## 2025-03-31 ENCOUNTER — PATIENT MESSAGE (OUTPATIENT)
Dept: OBSTETRICS AND GYNECOLOGY | Facility: CLINIC | Age: 35
End: 2025-03-31
Payer: MEDICAID

## 2025-03-31 ENCOUNTER — PATIENT MESSAGE (OUTPATIENT)
Dept: ORTHOPEDICS | Facility: CLINIC | Age: 35
End: 2025-03-31
Payer: MEDICAID

## 2025-03-31 DIAGNOSIS — E66.813 CLASS 3 SEVERE OBESITY DUE TO EXCESS CALORIES WITH SERIOUS COMORBIDITY AND BODY MASS INDEX (BMI) OF 60.0 TO 69.9 IN ADULT: ICD-10-CM

## 2025-03-31 DIAGNOSIS — G47.33 OSA ON CPAP: ICD-10-CM

## 2025-03-31 DIAGNOSIS — E66.01 CLASS 3 SEVERE OBESITY DUE TO EXCESS CALORIES WITH SERIOUS COMORBIDITY AND BODY MASS INDEX (BMI) OF 60.0 TO 69.9 IN ADULT: ICD-10-CM

## 2025-03-31 DIAGNOSIS — G43.111 MIGRAINE WITH AURA, INTRACTABLE, WITH STATUS MIGRAINOSUS: Primary | Chronic | ICD-10-CM

## 2025-03-31 PROBLEM — R60.0 EDEMA OF LOWER EXTREMITY: Status: ACTIVE | Noted: 2025-03-31

## 2025-03-31 PROBLEM — I10 BENIGN HYPERTENSION: Status: ACTIVE | Noted: 2025-03-31

## 2025-03-31 PROBLEM — R32 URINARY INCONTINENCE: Status: ACTIVE | Noted: 2025-03-31

## 2025-03-31 PROCEDURE — 4010F ACE/ARB THERAPY RXD/TAKEN: CPT | Mod: CPTII,95,, | Performed by: NURSE PRACTITIONER

## 2025-03-31 PROCEDURE — 1160F RVW MEDS BY RX/DR IN RCRD: CPT | Mod: CPTII,95,, | Performed by: NURSE PRACTITIONER

## 2025-03-31 PROCEDURE — 98006 SYNCH AUDIO-VIDEO EST MOD 30: CPT | Mod: 95,,, | Performed by: NURSE PRACTITIONER

## 2025-03-31 PROCEDURE — 1159F MED LIST DOCD IN RCRD: CPT | Mod: CPTII,95,, | Performed by: NURSE PRACTITIONER

## 2025-03-31 RX ORDER — UBROGEPANT 100 MG/1
100 TABLET ORAL 2 TIMES DAILY PRN
Qty: 10 TABLET | Refills: 2 | Status: SHIPPED | OUTPATIENT
Start: 2025-03-31

## 2025-03-31 RX ORDER — FREMANEZUMAB-VFRM 225 MG/1.5ML
225 INJECTION SUBCUTANEOUS
Qty: 1 EACH | Refills: 6 | Status: SHIPPED | OUTPATIENT
Start: 2025-03-31 | End: 2025-03-31
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
## 2025-04-02 ENCOUNTER — OFFICE VISIT (OUTPATIENT)
Dept: ORTHOPEDICS | Facility: CLINIC | Age: 35
End: 2025-04-02
Payer: MEDICAID

## 2025-04-02 VITALS
HEIGHT: 64 IN | HEART RATE: 96 BPM | DIASTOLIC BLOOD PRESSURE: 88 MMHG | TEMPERATURE: 98 F | WEIGHT: 293 LBS | SYSTOLIC BLOOD PRESSURE: 132 MMHG | BODY MASS INDEX: 50.02 KG/M2

## 2025-04-02 DIAGNOSIS — G56.03 BILATERAL CARPAL TUNNEL SYNDROME: Primary | ICD-10-CM

## 2025-04-02 PROCEDURE — 99214 OFFICE O/P EST MOD 30 MIN: CPT | Mod: PBBFAC

## 2025-04-02 RX ORDER — CEPHALEXIN 500 MG/1
500 CAPSULE ORAL 3 TIMES DAILY
Qty: 30 CAPSULE | Refills: 0 | Status: SHIPPED | OUTPATIENT
Start: 2025-04-02 | End: 2025-04-12

## 2025-04-02 NOTE — PROGRESS NOTES
[facility]  New Patient Office Visit  04/02/2025     Patient ID: Mackenzie Church  YOB: 1990  MRN: [id number]    Diagnosis:    Bilateral carpal tunnel syndrome    Chief Complaint: Follow-up of the Right Hand (Incision swollen and painful sx was 2/20/25  Taking nothing for pain )    Mackenzie Church is a 34 y.o. female who is 6 weeks status post right carpal tunnel decompression at the wrist and right ulnar nerve decompression in the cubital tunnel.     Came in today complaining of her right carpal tunnel incision with an occasional drainage.  An very sensitive.    Past Medical History:    Past Medical History:   Diagnosis Date    Amenorrhea     Anemia     Carpal tunnel syndrome     Dyspareunia     Endometriosis, unspecified     Fibroid     Hiatal hernia     HTN (hypertension)     Infertility, female     Legal blindness     Migraine     Neuropathy     PCOS (polycystic ovarian syndrome)     Sciatica      Past Surgical History:   Procedure Laterality Date    CARPAL TUNNEL RELEASE Right 2/20/2025    Procedure: RELEASE, CARPAL TUNNEL;  Surgeon: Shravan River MD;  Location: [facility];  Service: Orthopedics;  Laterality: Right;    REATTACHMENT OF FINGER Right     second and third digits    TONSILLECTOMY, ADENOIDECTOMY      ULNAR TUNNEL RELEASE Right 2/20/2025    Procedure: RELEASE, CUBITAL TUNNEL;  Surgeon: Shravan River MD;  Location: [facility];  Service: Orthopedics;  Laterality: Right;     Family History   Problem Relation Name Age of Onset    Fibromyalgia Mother Isabel Ramirez     Lupus Mother Isabel Ramirez     HIV Mother Isabel Ramirez     Bipolar disorder Mother Isabel Ramirez     Asthma Mother Isabel Ramirez     Hypertension Mother Isabel Ramirez     Arthritis Mother Isabel Ramirez     Depression Mother Isabel Ramirez     Drug abuse Mother Isabel Ramirez     Kidney disease Mother Isabel Ramirez     Mental illness Mother Isabel Ramirez     Miscarriages / Stillbirths  Mother Isabel Ramirez     Lung cancer Father Fernie Ranjit Sr.     Asthma Father Fernie Ranjit Sr.     Cancer Father Fernie Ranjit Sr.     Arthritis Father Fernie Ranjit Sr.     Drug abuse Father Fernie Ranjit Sr.     Hearing loss Father Fernie Ranjit Sr.     Mental illness Father Fernie Ranjit Sr.     Lupus Sister Carmel Grady     Hypertension Sister Carmel Grady     Arthritis Sister Carmel Grady     Depression Sister Carmel Grady     Kidney disease Sister Carmel Grady     Mental illness Sister Carmel Grady     Graves' disease Brother Fernie Ranjit Jr     Arthritis Brother Fernie Church Jr     Mental illness Brother Fernie Church Jr     Prostate cancer Paternal Uncle      Stomach cancer Paternal Grandmother Jenifer Ralph Church     Asthma Paternal Grandmother Jenifer Ralph Ranjit     Diabetes Paternal Grandmother Jenifer Ralph Ranjit     Heart disease Paternal Grandmother Jenifer Ralph Ranjit     Cancer Maternal Grandfather Shravan Mckinnono     Rheum arthritis Maternal Grandmother Ruth Lilli     Asthma Maternal Grandmother Ruth Lilli     Cancer Maternal Grandmother Ruth Lilli     Diabetes Maternal Grandmother Ruth Lilli     Stroke Maternal Grandmother Ruth Lilli     COPD Maternal Grandmother Ruth Lilli     Depression Maternal Grandmother Ruth Lilli     Heart disease Maternal Grandmother Ruth Lilli     Cancer Paternal Grandfather Ivan Ranjit     Heart disease Paternal Grandfather Ivan Ranjit     Cancer Maternal Uncle Major Ramirez     Alcohol abuse Maternal Uncle Major Herkimer     Cancer Paternal Uncle Allen Church     Alcohol abuse Paternal Uncle Allen Church     Diabetes Maternal Uncle Baltazar Ramirez     Mental illness Maternal Uncle Baltazar Ramirez     Vision loss Maternal Uncle Baltazar Ramirez     Diabetes Maternal Uncle Osvaldo Ramirez     Kidney disease Sister Carline Ashley Moon     Mental illness Sister Carline Ashley Moon     Miscarriages / Stillbirths Sister Carline Ramirez  Red     Learning disabilities Maternal Aunt Padmini Ramirez     Mental illness Maternal Aunt Padmini Ramirez     Learning disabilities Brother Demarcus Ramirez     Learning disabilities Maternal Cousin Alisia Ramirez     Learning disabilities Maternal Cousin Afua Ramirez     Learning disabilities Maternal Cousin Isabel Ramirez     Vaginal cancer Maternal Aunt Nisha Jang          of servical cancer     Social History     Socioeconomic History    Marital status: Single   Tobacco Use    Smoking status: Every Day     Current packs/day: 0.50     Average packs/day: 0.5 packs/day for 20.0 years (10.0 ttl pk-yrs)     Types: Cigarettes    Smokeless tobacco: Never    Tobacco comments:     Working on quitting, went from 2 packs daily to 1/2 pack daily   Substance and Sexual Activity    Alcohol use: Yes     Comment: socially    Drug use: Never    Sexual activity: Not Currently     Partners: Male     Birth control/protection: Condom     Medication List with Changes/Refills   Current Medications    ALBUTEROL (PROVENTIL) 5 MG/ML NEBULIZER SOLUTION    Take 2.5 mg by nebulization every 6 (six) hours as needed for Wheezing. Rescue    CHOLECALCIFEROL, VITAMIN D3, 1,250 MCG (50,000 UNIT) CAPSULE    Take by mouth.    HYDRALAZINE (APRESOLINE) 25 MG TABLET    Take 25 mg by mouth every 8 (eight) hours as needed.    HYDROCHLOROTHIAZIDE (HYDRODIURIL) 25 MG TABLET    Take 25 mg by mouth once daily.    IBUPROFEN (ADVIL,MOTRIN) 800 MG TABLET    Take 1 tablet (800 mg total) by mouth every 6 (six) hours as needed for Pain.    LEVOCETIRIZINE (XYZAL) 5 MG TABLET    Take 5 mg by mouth once daily.    LISINOPRIL 10 MG TABLET    Take 10 mg by mouth once daily.    MAGNESIUM OXIDE (MAG-OX) 400 MG (241.3 MG MAGNESIUM) TABLET    TAKE ONE TABLET BY MOUTH EVERY DAY    METOCLOPRAMIDE HCL (REGLAN) 10 MG TABLET    Take 1 tablet (10 mg total) by mouth 3 (three) times daily.    MULTIVITAMIN (THERAGRAN) PER TABLET    Take 1 tablet by mouth once  daily.    OXYBUTYNIN (DITROPAN) 5 MG TAB    Take 5 mg by mouth once daily.    RED BEET ROOT-SOUR CHERRY EXT ORAL    Take by mouth.    TAZTIA  MG CS24    Take 240 mg by mouth once daily.    TIZANIDINE (ZANAFLEX) 4 MG TABLET    Take 4 mg by mouth every evening. Takes PRN    TRAZODONE (DESYREL) 50 MG TABLET    Take 50 mg by mouth every evening.    UBROGEPANT (UBRELVY) 100 MG TABLET    Take 1 tablet (100 mg total) by mouth 2 (two) times daily as needed for Migraine. If symptoms persist or return, may repeat dose after 2 hours. Maximum: 200 mg per 24 hours    VALACYCLOVIR (VALTREX) 1000 MG TABLET    Take 1,000 mg by mouth 2 (two) times daily as needed.     Review of patient's allergies indicates:   Allergen Reactions    Robaxin [methocarbamol] Itching and Nausea And Vomiting     ROS:        Physical Exam:  Her right carpal tunnel incision shows 1 area with a small amount of crusting.  It is thickened and it is very tender.    Imaging:  New imaging obtained today    Assessment and Plan:  Mackenzie Church is a 34 y.o. female seen in the office today for bilateral carpal tunnel, right greater than left, and right cubital tunnel syndrome -- we will go ahead and call her in antibiotics.  We will do 10 days of Keflex.  I advised her to do scar massage.  We will see her in 3 weeks.    Shravan River MD  [facility]

## 2025-04-22 ENCOUNTER — HOSPITAL ENCOUNTER (EMERGENCY)
Facility: HOSPITAL | Age: 35
Discharge: HOME | End: 2025-04-22
Attending: STUDENT IN AN ORGANIZED HEALTH CARE EDUCATION/TRAINING PROGRAM
Payer: COMMERCIAL

## 2025-04-22 VITALS
HEIGHT: 64 IN | DIASTOLIC BLOOD PRESSURE: 90 MMHG | RESPIRATION RATE: 18 BRPM | TEMPERATURE: 98 F | BODY MASS INDEX: 50.02 KG/M2 | WEIGHT: 293 LBS | SYSTOLIC BLOOD PRESSURE: 165 MMHG | HEART RATE: 100 BPM | OXYGEN SATURATION: 98 %
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
## 2025-04-22 DIAGNOSIS — M79.603 ARM PAIN: ICD-10-CM

## 2025-04-22 DIAGNOSIS — S62.102A CLOSED FRACTURE OF LEFT WRIST, INITIAL ENCOUNTER: Primary | ICD-10-CM

## 2025-04-22 DIAGNOSIS — S62.647A CLOSED NONDISPLACED FRACTURE OF PROXIMAL PHALANX OF LEFT LITTLE FINGER, INITIAL ENCOUNTER: ICD-10-CM

## 2025-04-22 DIAGNOSIS — I10 HYPERTENSION, UNSPECIFIED TYPE: ICD-10-CM

## 2025-04-22 LAB
ABORH RETYPE: NORMAL
ACCEPTIBLE SP GR UR QL: 1.02 (ref 1–1.03)
ALBUMIN SERPL-MCNC: 4 G/DL (ref 3.5–5)
ALBUMIN/GLOB SERPL: 1.1 RATIO (ref 1.1–2)
ALP SERPL-CCNC: 71 UNIT/L (ref 40–150)
ALT SERPL-CCNC: 12 UNIT/L (ref 0–55)
AMPHET UR QL SCN: NEGATIVE
ANION GAP SERPL CALC-SCNC: 10 MEQ/L
APTT PPP: 33.5 SECONDS (ref 23.2–33.7)
AST SERPL-CCNC: 21 UNIT/L (ref 11–45)
B-HCG UR QL: NEGATIVE
BACTERIA #/AREA URNS AUTO: ABNORMAL /HPF
BARBITURATE SCN PRESENT UR: NEGATIVE
BASOPHILS # BLD AUTO: 0.03 X10(3)/MCL
BASOPHILS NFR BLD AUTO: 0.2 %
BENZODIAZ UR QL SCN: NEGATIVE
BILIRUB SERPL-MCNC: 0.4 MG/DL
BILIRUB UR QL STRIP.AUTO: NEGATIVE
BUN SERPL-MCNC: 9 MG/DL (ref 7–18.7)
CALCIUM SERPL-MCNC: 9.3 MG/DL (ref 8.4–10.2)
CANNABINOIDS UR QL SCN: NEGATIVE
CHLORIDE SERPL-SCNC: 108 MMOL/L (ref 98–107)
CLARITY UR: ABNORMAL
CO2 SERPL-SCNC: 21 MMOL/L (ref 22–29)
COCAINE UR QL SCN: NEGATIVE
COLOR UR AUTO: ABNORMAL
CREAT SERPL-MCNC: 0.73 MG/DL (ref 0.55–1.02)
CREAT/UREA NIT SERPL: 12
EOSINOPHIL # BLD AUTO: 0.06 X10(3)/MCL (ref 0–0.9)
EOSINOPHIL NFR BLD AUTO: 0.5 %
ERYTHROCYTE [DISTWIDTH] IN BLOOD BY AUTOMATED COUNT: 13.2 % (ref 11.5–17)
ETHANOL SERPL-MCNC: <10 MG/DL
FENTANYL UR QL SCN: NEGATIVE
GFR SERPLBLD CREATININE-BSD FMLA CKD-EPI: >60 ML/MIN/1.73/M2
GLOBULIN SER-MCNC: 3.5 GM/DL (ref 2.4–3.5)
GLUCOSE SERPL-MCNC: 103 MG/DL (ref 74–100)
GLUCOSE UR QL STRIP: NORMAL
GROUP & RH: NORMAL
HCT VFR BLD AUTO: 41 % (ref 37–47)
HGB BLD-MCNC: 13.2 G/DL (ref 12–16)
HGB UR QL STRIP: NEGATIVE
IMM GRANULOCYTES # BLD AUTO: 0.06 X10(3)/MCL (ref 0–0.04)
IMM GRANULOCYTES NFR BLD AUTO: 0.5 %
INDIRECT COOMBS: NORMAL
INR PPP: 1.1
KETONES UR QL STRIP: NEGATIVE
LACTATE SERPL-SCNC: 1.5 MMOL/L (ref 0.5–2.2)
LEUKOCYTE ESTERASE UR QL STRIP: 75
LYMPHOCYTES # BLD AUTO: 1.89 X10(3)/MCL (ref 0.6–4.6)
LYMPHOCYTES NFR BLD AUTO: 15.5 %
MCH RBC QN AUTO: 27.9 PG (ref 27–31)
MCHC RBC AUTO-ENTMCNC: 32.2 G/DL (ref 33–36)
MCV RBC AUTO: 86.7 FL (ref 80–94)
MDMA UR QL SCN: NEGATIVE
MONOCYTES # BLD AUTO: 0.7 X10(3)/MCL (ref 0.1–1.3)
MONOCYTES NFR BLD AUTO: 5.7 %
NEUTROPHILS # BLD AUTO: 9.46 X10(3)/MCL (ref 2.1–9.2)
NEUTROPHILS NFR BLD AUTO: 77.6 %
NITRITE UR QL STRIP: NEGATIVE
NRBC BLD AUTO-RTO: 0 %
OPIATES UR QL SCN: POSITIVE
PCP UR QL: NEGATIVE
PH UR STRIP: 6 [PH]
PH UR: 6 [PH] (ref 3–11)
PLATELET # BLD AUTO: 290 X10(3)/MCL (ref 130–400)
PMV BLD AUTO: 10.4 FL (ref 7.4–10.4)
POTASSIUM SERPL-SCNC: 3.7 MMOL/L (ref 3.5–5.1)
PROT SERPL-MCNC: 7.5 GM/DL (ref 6.4–8.3)
PROT UR QL STRIP: NEGATIVE
PROTHROMBIN TIME: 14.4 SECONDS (ref 12.5–14.5)
RBC # BLD AUTO: 4.73 X10(6)/MCL (ref 4.2–5.4)
RBC #/AREA URNS AUTO: ABNORMAL /HPF
SODIUM SERPL-SCNC: 139 MMOL/L (ref 136–145)
SP GR UR STRIP.AUTO: 1.02 (ref 1–1.03)
SPECIMEN OUTDATE: NORMAL
SQUAMOUS #/AREA URNS LPF: ABNORMAL /HPF
UROBILINOGEN UR STRIP-ACNC: NORMAL
WBC # BLD AUTO: 12.2 X10(3)/MCL (ref 4.5–11.5)
WBC #/AREA URNS AUTO: ABNORMAL /HPF

## 2025-04-22 PROCEDURE — 63600175 PHARM REV CODE 636 W HCPCS

## 2025-04-22 PROCEDURE — 99285 EMERGENCY DEPT VISIT HI MDM: CPT | Mod: 25

## 2025-04-22 PROCEDURE — 96361 HYDRATE IV INFUSION ADD-ON: CPT

## 2025-04-22 PROCEDURE — 96374 THER/PROPH/DIAG INJ IV PUSH: CPT

## 2025-04-22 PROCEDURE — 81025 URINE PREGNANCY TEST: CPT | Performed by: STUDENT IN AN ORGANIZED HEALTH CARE EDUCATION/TRAINING PROGRAM

## 2025-04-22 PROCEDURE — 83605 ASSAY OF LACTIC ACID: CPT | Performed by: STUDENT IN AN ORGANIZED HEALTH CARE EDUCATION/TRAINING PROGRAM

## 2025-04-22 PROCEDURE — 86901 BLOOD TYPING SEROLOGIC RH(D): CPT | Performed by: STUDENT IN AN ORGANIZED HEALTH CARE EDUCATION/TRAINING PROGRAM

## 2025-04-22 PROCEDURE — 82077 ASSAY SPEC XCP UR&BREATH IA: CPT | Performed by: STUDENT IN AN ORGANIZED HEALTH CARE EDUCATION/TRAINING PROGRAM

## 2025-04-22 PROCEDURE — 85730 THROMBOPLASTIN TIME PARTIAL: CPT | Performed by: STUDENT IN AN ORGANIZED HEALTH CARE EDUCATION/TRAINING PROGRAM

## 2025-04-22 PROCEDURE — 25500020 PHARM REV CODE 255: Performed by: STUDENT IN AN ORGANIZED HEALTH CARE EDUCATION/TRAINING PROGRAM

## 2025-04-22 PROCEDURE — 80053 COMPREHEN METABOLIC PANEL: CPT | Performed by: STUDENT IN AN ORGANIZED HEALTH CARE EDUCATION/TRAINING PROGRAM

## 2025-04-22 PROCEDURE — 81001 URINALYSIS AUTO W/SCOPE: CPT | Mod: XB | Performed by: STUDENT IN AN ORGANIZED HEALTH CARE EDUCATION/TRAINING PROGRAM

## 2025-04-22 PROCEDURE — 85025 COMPLETE CBC W/AUTO DIFF WBC: CPT | Performed by: STUDENT IN AN ORGANIZED HEALTH CARE EDUCATION/TRAINING PROGRAM

## 2025-04-22 PROCEDURE — 25000003 PHARM REV CODE 250: Performed by: STUDENT IN AN ORGANIZED HEALTH CARE EDUCATION/TRAINING PROGRAM

## 2025-04-22 PROCEDURE — 80307 DRUG TEST PRSMV CHEM ANLYZR: CPT | Performed by: STUDENT IN AN ORGANIZED HEALTH CARE EDUCATION/TRAINING PROGRAM

## 2025-04-22 PROCEDURE — 29125 APPL SHORT ARM SPLINT STATIC: CPT | Mod: LT

## 2025-04-22 PROCEDURE — 85610 PROTHROMBIN TIME: CPT | Performed by: STUDENT IN AN ORGANIZED HEALTH CARE EDUCATION/TRAINING PROGRAM

## 2025-04-22 PROCEDURE — 63600175 PHARM REV CODE 636 W HCPCS: Performed by: STUDENT IN AN ORGANIZED HEALTH CARE EDUCATION/TRAINING PROGRAM

## 2025-04-22 PROCEDURE — 96376 TX/PRO/DX INJ SAME DRUG ADON: CPT

## 2025-04-22 PROCEDURE — G0390 TRAUMA RESPONS W/HOSP CRITI: HCPCS

## 2025-04-22 PROCEDURE — 99283 EMERGENCY DEPT VISIT LOW MDM: CPT | Mod: ,,,

## 2025-04-22 PROCEDURE — 96375 TX/PRO/DX INJ NEW DRUG ADDON: CPT

## 2025-04-22 RX ORDER — LISINOPRIL 10 MG/1
10 TABLET ORAL
Status: DISCONTINUED | OUTPATIENT
Start: 2025-04-22 | End: 2025-04-22

## 2025-04-22 RX ORDER — MORPHINE SULFATE 4 MG/ML
8 INJECTION, SOLUTION INTRAMUSCULAR; INTRAVENOUS
Status: COMPLETED | OUTPATIENT
Start: 2025-04-22 | End: 2025-04-22

## 2025-04-22 RX ORDER — HYDRALAZINE HYDROCHLORIDE 20 MG/ML
20 INJECTION INTRAMUSCULAR; INTRAVENOUS
Status: COMPLETED | OUTPATIENT
Start: 2025-04-22 | End: 2025-04-22

## 2025-04-22 RX ORDER — ONDANSETRON HYDROCHLORIDE 2 MG/ML
INJECTION, SOLUTION INTRAVENOUS
Status: COMPLETED
Start: 2025-04-22 | End: 2025-04-22

## 2025-04-22 RX ORDER — LABETALOL HYDROCHLORIDE 5 MG/ML
10 INJECTION, SOLUTION INTRAVENOUS
Status: DISCONTINUED | OUTPATIENT
Start: 2025-04-22 | End: 2025-04-22

## 2025-04-22 RX ORDER — FENTANYL CITRATE 50 UG/ML
INJECTION, SOLUTION INTRAMUSCULAR; INTRAVENOUS
Status: DISCONTINUED
Start: 2025-04-22 | End: 2025-04-22 | Stop reason: HOSPADM

## 2025-04-22 RX ORDER — HYDROCODONE BITARTRATE AND ACETAMINOPHEN 10; 325 MG/1; MG/1
1 TABLET ORAL EVERY 8 HOURS PRN
Qty: 15 TABLET | Refills: 0 | Status: SHIPPED | OUTPATIENT
Start: 2025-04-22 | End: 2025-04-27

## 2025-04-22 RX ORDER — KETOROLAC TROMETHAMINE 10 MG/1
10 TABLET, FILM COATED ORAL EVERY 8 HOURS PRN
Qty: 15 TABLET | Refills: 0 | Status: SHIPPED | OUTPATIENT
Start: 2025-04-22 | End: 2025-04-27

## 2025-04-22 RX ORDER — MORPHINE SULFATE 4 MG/ML
INJECTION, SOLUTION INTRAMUSCULAR; INTRAVENOUS
Status: DISCONTINUED
Start: 2025-04-22 | End: 2025-04-22 | Stop reason: HOSPADM

## 2025-04-22 RX ORDER — SODIUM CHLORIDE 9 MG/ML
INJECTION, SOLUTION INTRAVENOUS
Status: COMPLETED | OUTPATIENT
Start: 2025-04-22 | End: 2025-04-22

## 2025-04-22 RX ORDER — ONDANSETRON HYDROCHLORIDE 2 MG/ML
INJECTION, SOLUTION INTRAVENOUS CODE/TRAUMA/SEDATION MEDICATION
Status: COMPLETED | OUTPATIENT
Start: 2025-04-22 | End: 2025-04-22

## 2025-04-22 RX ORDER — ONDANSETRON HYDROCHLORIDE 2 MG/ML
4 INJECTION, SOLUTION INTRAVENOUS
Status: COMPLETED | OUTPATIENT
Start: 2025-04-22 | End: 2025-04-22

## 2025-04-22 RX ORDER — HYDRALAZINE HYDROCHLORIDE 50 MG/1
50 TABLET, FILM COATED ORAL
Status: DISCONTINUED | OUTPATIENT
Start: 2025-04-22 | End: 2025-04-22

## 2025-04-22 RX ORDER — MORPHINE SULFATE 4 MG/ML
INJECTION, SOLUTION INTRAMUSCULAR; INTRAVENOUS CODE/TRAUMA/SEDATION MEDICATION
Status: COMPLETED | OUTPATIENT
Start: 2025-04-22 | End: 2025-04-22

## 2025-04-22 RX ADMIN — HYDRALAZINE HYDROCHLORIDE 20 MG: 20 INJECTION INTRAMUSCULAR; INTRAVENOUS at 06:04

## 2025-04-22 RX ADMIN — SODIUM CHLORIDE 1000 ML: 9 INJECTION, SOLUTION INTRAVENOUS at 05:04

## 2025-04-22 RX ADMIN — ONDANSETRON 4 MG: 2 INJECTION INTRAMUSCULAR; INTRAVENOUS at 07:04

## 2025-04-22 RX ADMIN — ONDANSETRON 4 MG: 2 INJECTION INTRAMUSCULAR; INTRAVENOUS at 05:04

## 2025-04-22 RX ADMIN — IOHEXOL 92 ML: 350 INJECTION, SOLUTION INTRAVENOUS at 05:04

## 2025-04-22 RX ADMIN — MORPHINE SULFATE 4 MG: 4 INJECTION, SOLUTION INTRAMUSCULAR; INTRAVENOUS at 05:04

## 2025-04-22 RX ADMIN — ONDANSETRON HYDROCHLORIDE 4 MG: 2 INJECTION, SOLUTION INTRAVENOUS at 07:04

## 2025-04-22 RX ADMIN — MORPHINE SULFATE 4 MG: 4 INJECTION INTRAVENOUS at 07:04

## 2025-04-22 NOTE — Clinical Note
"Mackenzie Pan"Ranjit was seen and treated in our emergency department on 4/22/2025.  She may return to work on 05/06/2025.       If you have any questions or concerns, please don't hesitate to call.      Cleveland Mims MD"
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
## 2025-04-22 NOTE — DISCHARGE INSTRUCTIONS
Follow-up with your primary care physician.      Follow-up with orthopedic surgery.      Return to the emergency department if any new or worsening symptoms, worsening pain, nausea, vomiting, or any other concerns.
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
## 2025-04-22 NOTE — Clinical Note
"Mackenzie Pan"Ranjit was seen and treated in our emergency department on 4/22/2025.  She may return with limitations on 05/06/2025.  Return with limitations, no use of L arm. No lifting, bending, twisting     Sincerely,      Cleveland Mims MD    "

## 2025-04-23 ENCOUNTER — OFFICE VISIT (OUTPATIENT)
Dept: ORTHOPEDICS | Facility: CLINIC | Age: 35
End: 2025-04-23
Payer: MEDICAID

## 2025-04-23 VITALS
BODY MASS INDEX: 50.02 KG/M2 | WEIGHT: 293 LBS | SYSTOLIC BLOOD PRESSURE: 161 MMHG | HEIGHT: 64 IN | DIASTOLIC BLOOD PRESSURE: 101 MMHG | HEART RATE: 85 BPM
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
## 2025-04-23 DIAGNOSIS — S62.647A CLOSED NONDISPLACED FRACTURE OF PROXIMAL PHALANX OF LEFT LITTLE FINGER, INITIAL ENCOUNTER: ICD-10-CM

## 2025-04-23 DIAGNOSIS — S62.102A CLOSED FRACTURE OF LEFT WRIST, INITIAL ENCOUNTER: Primary | ICD-10-CM

## 2025-04-23 RX ORDER — SODIUM CHLORIDE 9 MG/ML
INJECTION, SOLUTION INTRAVENOUS CONTINUOUS
Status: CANCELLED | OUTPATIENT
Start: 2025-04-23
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
## 2025-04-24 ENCOUNTER — ANESTHESIA EVENT (OUTPATIENT)
Dept: SURGERY | Facility: HOSPITAL | Age: 35
End: 2025-04-24
Payer: MEDICAID

## 2025-04-25 ENCOUNTER — HOSPITAL ENCOUNTER (OUTPATIENT)
Facility: HOSPITAL | Age: 35
Discharge: HOME | End: 2025-04-25
Attending: REHABILITATION UNIT | Admitting: REHABILITATION UNIT
Payer: MEDICAID

## 2025-04-25 ENCOUNTER — ANESTHESIA (OUTPATIENT)
Dept: SURGERY | Facility: HOSPITAL | Age: 35
End: 2025-04-25
Payer: MEDICAID

## 2025-04-25 DIAGNOSIS — G89.18 POST-OP PAIN: Primary | ICD-10-CM

## 2025-04-25 DIAGNOSIS — S62.102A CLOSED FRACTURE OF LEFT WRIST, INITIAL ENCOUNTER: ICD-10-CM

## 2025-04-25 LAB
B-HCG UR QL: NEGATIVE
CTP QC/QA: YES

## 2025-04-25 PROCEDURE — 25000242 PHARM REV CODE 250 ALT 637 W/ HCPCS

## 2025-04-25 PROCEDURE — 26727 TREAT FINGER FRACTURE EACH: CPT | Mod: 79,51,F4, | Performed by: REHABILITATION UNIT

## 2025-04-25 PROCEDURE — 81025 URINE PREGNANCY TEST: CPT | Performed by: REHABILITATION UNIT

## 2025-04-25 PROCEDURE — 27201423 OPTIME MED/SURG SUP & DEVICES STERILE SUPPLY: Performed by: REHABILITATION UNIT

## 2025-04-25 PROCEDURE — C1769 GUIDE WIRE: HCPCS | Performed by: REHABILITATION UNIT

## 2025-04-25 PROCEDURE — 37000008 HC ANESTHESIA 1ST 15 MINUTES: Performed by: REHABILITATION UNIT

## 2025-04-25 PROCEDURE — 36000710: Performed by: REHABILITATION UNIT

## 2025-04-25 PROCEDURE — 25609 OPTX DST RD XART FX/EP SEP3+: CPT | Mod: 79,LT,, | Performed by: REHABILITATION UNIT

## 2025-04-25 PROCEDURE — 63600175 PHARM REV CODE 636 W HCPCS: Performed by: ANESTHESIOLOGY

## 2025-04-25 PROCEDURE — 37000009 HC ANESTHESIA EA ADD 15 MINS: Performed by: REHABILITATION UNIT

## 2025-04-25 PROCEDURE — 25609 OPTX DST RD XART FX/EP SEP3+: CPT | Mod: AS,79,LT,

## 2025-04-25 PROCEDURE — 25000003 PHARM REV CODE 250: Performed by: REHABILITATION UNIT

## 2025-04-25 PROCEDURE — 36000711: Performed by: REHABILITATION UNIT

## 2025-04-25 PROCEDURE — C1713 ANCHOR/SCREW BN/BN,TIS/BN: HCPCS | Performed by: REHABILITATION UNIT

## 2025-04-25 PROCEDURE — 71000015 HC POSTOP RECOV 1ST HR: Performed by: REHABILITATION UNIT

## 2025-04-25 PROCEDURE — 71000033 HC RECOVERY, INTIAL HOUR: Performed by: REHABILITATION UNIT

## 2025-04-25 PROCEDURE — 25000003 PHARM REV CODE 250

## 2025-04-25 PROCEDURE — 63600175 PHARM REV CODE 636 W HCPCS

## 2025-04-25 PROCEDURE — 63600175 PHARM REV CODE 636 W HCPCS: Performed by: REHABILITATION UNIT

## 2025-04-25 PROCEDURE — 64415 NJX AA&/STRD BRCH PLXS IMG: CPT | Performed by: ANESTHESIOLOGY

## 2025-04-25 DEVICE — PLATE BONE LOK COMP LEFT 2.7MM: Type: IMPLANTABLE DEVICE | Site: RADIUS | Status: FUNCTIONAL

## 2025-04-25 DEVICE — SCREW SQUARE LOK TI 2.7X18MM: Type: IMPLANTABLE DEVICE | Site: RADIUS | Status: FUNCTIONAL

## 2025-04-25 DEVICE — SCREW BONE CORTICAL 2.7X22MM: Type: IMPLANTABLE DEVICE | Site: RADIUS | Status: FUNCTIONAL

## 2025-04-25 DEVICE — SCREW BONE CORTICAL 2.7X14MM: Type: IMPLANTABLE DEVICE | Site: RADIUS | Status: FUNCTIONAL

## 2025-04-25 DEVICE — SCREW BONE CORTICAL 2.7X16MM: Type: IMPLANTABLE DEVICE | Site: RADIUS | Status: FUNCTIONAL

## 2025-04-25 DEVICE — SCREW SQUARE LOK 2.7X14MM: Type: IMPLANTABLE DEVICE | Site: RADIUS | Status: FUNCTIONAL

## 2025-04-25 DEVICE — SCREW SQUARE LOK TI 2.7X20MM: Type: IMPLANTABLE DEVICE | Site: RADIUS | Status: FUNCTIONAL

## 2025-04-25 RX ORDER — PROPOFOL 10 MG/ML
VIAL (ML) INTRAVENOUS
Status: DISCONTINUED | OUTPATIENT
Start: 2025-04-25 | End: 2025-04-25

## 2025-04-25 RX ORDER — KETOROLAC TROMETHAMINE 30 MG/ML
15 INJECTION, SOLUTION INTRAMUSCULAR; INTRAVENOUS EVERY 8 HOURS PRN
Status: DISCONTINUED | OUTPATIENT
Start: 2025-04-25 | End: 2025-04-25 | Stop reason: HOSPADM

## 2025-04-25 RX ORDER — DEXMEDETOMIDINE HYDROCHLORIDE 100 UG/ML
INJECTION, SOLUTION INTRAVENOUS
Status: DISCONTINUED | OUTPATIENT
Start: 2025-04-25 | End: 2025-04-25

## 2025-04-25 RX ORDER — ALBUTEROL SULFATE 90 UG/1
INHALANT RESPIRATORY (INHALATION)
Status: DISCONTINUED | OUTPATIENT
Start: 2025-04-25 | End: 2025-04-25

## 2025-04-25 RX ORDER — DEXAMETHASONE SODIUM PHOSPHATE 4 MG/ML
INJECTION, SOLUTION INTRA-ARTICULAR; INTRALESIONAL; INTRAMUSCULAR; INTRAVENOUS; SOFT TISSUE
Status: DISCONTINUED | OUTPATIENT
Start: 2025-04-25 | End: 2025-04-25

## 2025-04-25 RX ORDER — ROCURONIUM BROMIDE 10 MG/ML
INJECTION, SOLUTION INTRAVENOUS
Status: DISCONTINUED | OUTPATIENT
Start: 2025-04-25 | End: 2025-04-25

## 2025-04-25 RX ORDER — ROPIVACAINE HYDROCHLORIDE 5 MG/ML
INJECTION, SOLUTION EPIDURAL; INFILTRATION; PERINEURAL
Status: COMPLETED | OUTPATIENT
Start: 2025-04-25 | End: 2025-04-25

## 2025-04-25 RX ORDER — HYDROCODONE BITARTRATE AND ACETAMINOPHEN 10; 325 MG/1; MG/1
1 TABLET ORAL EVERY 6 HOURS PRN
Qty: 28 TABLET | Refills: 0 | Status: SHIPPED | OUTPATIENT
Start: 2025-04-25 | End: 2025-05-02 | Stop reason: SDUPTHER

## 2025-04-25 RX ORDER — OXYCODONE AND ACETAMINOPHEN 5; 325 MG/1; MG/1
1 TABLET ORAL
Status: DISCONTINUED | OUTPATIENT
Start: 2025-04-25 | End: 2025-04-25 | Stop reason: HOSPADM

## 2025-04-25 RX ORDER — MIDAZOLAM HYDROCHLORIDE 2 MG/2ML
INJECTION, SOLUTION INTRAMUSCULAR; INTRAVENOUS
Status: COMPLETED
Start: 2025-04-25 | End: 2025-04-25

## 2025-04-25 RX ORDER — HYDROMORPHONE HYDROCHLORIDE 2 MG/ML
0.2 INJECTION, SOLUTION INTRAMUSCULAR; INTRAVENOUS; SUBCUTANEOUS EVERY 5 MIN PRN
Status: DISCONTINUED | OUTPATIENT
Start: 2025-04-25 | End: 2025-04-25 | Stop reason: HOSPADM

## 2025-04-25 RX ORDER — SUCCINYLCHOLINE CHLORIDE 20 MG/ML
INJECTION INTRAMUSCULAR; INTRAVENOUS
Status: DISCONTINUED | OUTPATIENT
Start: 2025-04-25 | End: 2025-04-25

## 2025-04-25 RX ORDER — METOCLOPRAMIDE HYDROCHLORIDE 5 MG/ML
10 INJECTION INTRAMUSCULAR; INTRAVENOUS EVERY 6 HOURS PRN
Status: DISCONTINUED | OUTPATIENT
Start: 2025-04-25 | End: 2025-04-25 | Stop reason: HOSPADM

## 2025-04-25 RX ORDER — SODIUM CHLORIDE 9 MG/ML
INJECTION, SOLUTION INTRAVENOUS CONTINUOUS
Status: DISCONTINUED | OUTPATIENT
Start: 2025-04-25 | End: 2025-04-25 | Stop reason: HOSPADM

## 2025-04-25 RX ORDER — MORPHINE SULFATE 4 MG/ML
4 INJECTION, SOLUTION INTRAMUSCULAR; INTRAVENOUS
Refills: 0 | Status: DISCONTINUED | OUTPATIENT
Start: 2025-04-25 | End: 2025-04-25 | Stop reason: HOSPADM

## 2025-04-25 RX ORDER — ONDANSETRON HYDROCHLORIDE 2 MG/ML
INJECTION, SOLUTION INTRAMUSCULAR; INTRAVENOUS
Status: DISCONTINUED | OUTPATIENT
Start: 2025-04-25 | End: 2025-04-25

## 2025-04-25 RX ORDER — MUPIROCIN 20 MG/G
OINTMENT TOPICAL 2 TIMES DAILY
Status: DISCONTINUED | OUTPATIENT
Start: 2025-04-25 | End: 2025-04-25 | Stop reason: HOSPADM

## 2025-04-25 RX ORDER — HALOPERIDOL LACTATE 5 MG/ML
0.5 INJECTION, SOLUTION INTRAMUSCULAR EVERY 10 MIN PRN
Status: DISCONTINUED | OUTPATIENT
Start: 2025-04-25 | End: 2025-04-25 | Stop reason: HOSPADM

## 2025-04-25 RX ORDER — GLYCOPYRROLATE 0.2 MG/ML
INJECTION INTRAMUSCULAR; INTRAVENOUS
Status: DISCONTINUED | OUTPATIENT
Start: 2025-04-25 | End: 2025-04-25

## 2025-04-25 RX ORDER — HYDROCODONE BITARTRATE AND ACETAMINOPHEN 5; 325 MG/1; MG/1
1 TABLET ORAL EVERY 4 HOURS PRN
Refills: 0 | Status: DISCONTINUED | OUTPATIENT
Start: 2025-04-25 | End: 2025-04-25 | Stop reason: HOSPADM

## 2025-04-25 RX ORDER — MIDAZOLAM HYDROCHLORIDE 1 MG/ML
2 INJECTION INTRAMUSCULAR; INTRAVENOUS ONCE
Status: DISCONTINUED | OUTPATIENT
Start: 2025-04-25 | End: 2025-04-25 | Stop reason: HOSPADM

## 2025-04-25 RX ORDER — FENTANYL CITRATE 50 UG/ML
25 INJECTION, SOLUTION INTRAMUSCULAR; INTRAVENOUS ONCE
Refills: 0 | Status: DISCONTINUED | OUTPATIENT
Start: 2025-04-25 | End: 2025-04-25 | Stop reason: HOSPADM

## 2025-04-25 RX ORDER — ROPIVACAINE HYDROCHLORIDE 5 MG/ML
INJECTION, SOLUTION EPIDURAL; INFILTRATION; PERINEURAL
Status: COMPLETED
Start: 2025-04-25 | End: 2025-04-25

## 2025-04-25 RX ORDER — LIDOCAINE HYDROCHLORIDE 20 MG/ML
INJECTION, SOLUTION EPIDURAL; INFILTRATION; INTRACAUDAL; PERINEURAL
Status: DISCONTINUED | OUTPATIENT
Start: 2025-04-25 | End: 2025-04-25

## 2025-04-25 RX ORDER — GLUCAGON 1 MG
1 KIT INJECTION
Status: DISCONTINUED | OUTPATIENT
Start: 2025-04-25 | End: 2025-04-25 | Stop reason: HOSPADM

## 2025-04-25 RX ORDER — FENTANYL CITRATE 50 UG/ML
INJECTION, SOLUTION INTRAMUSCULAR; INTRAVENOUS
Status: DISCONTINUED | OUTPATIENT
Start: 2025-04-25 | End: 2025-04-25

## 2025-04-25 RX ORDER — HYDROMORPHONE HYDROCHLORIDE 2 MG/ML
INJECTION, SOLUTION INTRAMUSCULAR; INTRAVENOUS; SUBCUTANEOUS
Status: DISCONTINUED | OUTPATIENT
Start: 2025-04-25 | End: 2025-04-25

## 2025-04-25 RX ORDER — FENTANYL CITRATE 50 UG/ML
INJECTION, SOLUTION INTRAMUSCULAR; INTRAVENOUS
Status: COMPLETED
Start: 2025-04-25 | End: 2025-04-25

## 2025-04-25 RX ORDER — ONDANSETRON HYDROCHLORIDE 2 MG/ML
4 INJECTION, SOLUTION INTRAVENOUS EVERY 6 HOURS PRN
Status: DISCONTINUED | OUTPATIENT
Start: 2025-04-25 | End: 2025-04-25 | Stop reason: HOSPADM

## 2025-04-25 RX ORDER — HYDRALAZINE HYDROCHLORIDE 20 MG/ML
10 INJECTION INTRAMUSCULAR; INTRAVENOUS ONCE
Status: COMPLETED | OUTPATIENT
Start: 2025-04-25 | End: 2025-04-25

## 2025-04-25 RX ORDER — CEFAZOLIN 2 G/1
2 INJECTION, POWDER, FOR SOLUTION INTRAMUSCULAR; INTRAVENOUS
Status: DISCONTINUED | OUTPATIENT
Start: 2025-04-25 | End: 2025-04-25

## 2025-04-25 RX ADMIN — FENTANYL CITRATE 100 MCG: 50 INJECTION, SOLUTION INTRAMUSCULAR; INTRAVENOUS at 08:04

## 2025-04-25 RX ADMIN — ALBUTEROL SULFATE 2 PUFF: 90 AEROSOL, METERED RESPIRATORY (INHALATION) at 08:04

## 2025-04-25 RX ADMIN — PROPOFOL 300 MG: 10 INJECTION, EMULSION INTRAVENOUS at 08:04

## 2025-04-25 RX ADMIN — PROPOFOL 50 MG: 10 INJECTION, EMULSION INTRAVENOUS at 10:04

## 2025-04-25 RX ADMIN — ROCURONIUM BROMIDE 20 MG: 10 SOLUTION INTRAVENOUS at 08:04

## 2025-04-25 RX ADMIN — LIDOCAINE HYDROCHLORIDE 100 MG: 20 INJECTION, SOLUTION EPIDURAL; INFILTRATION; INTRACAUDAL; PERINEURAL at 08:04

## 2025-04-25 RX ADMIN — ONDANSETRON 4 MG: 2 INJECTION INTRAMUSCULAR; INTRAVENOUS at 08:04

## 2025-04-25 RX ADMIN — HYDRALAZINE HYDROCHLORIDE 10 MG: 20 INJECTION INTRAMUSCULAR; INTRAVENOUS at 11:04

## 2025-04-25 RX ADMIN — DEXTROSE MONOHYDRATE 3 G: 5 INJECTION INTRAVENOUS at 09:04

## 2025-04-25 RX ADMIN — GLYCOPYRROLATE 0.2 MCG: 0.2 INJECTION INTRAMUSCULAR; INTRAVENOUS at 10:04

## 2025-04-25 RX ADMIN — DEXAMETHASONE SODIUM PHOSPHATE 8 MG: 4 INJECTION, SOLUTION INTRA-ARTICULAR; INTRALESIONAL; INTRAMUSCULAR; INTRAVENOUS; SOFT TISSUE at 08:04

## 2025-04-25 RX ADMIN — SUGAMMADEX 200 MG: 100 INJECTION, SOLUTION INTRAVENOUS at 10:04

## 2025-04-25 RX ADMIN — SODIUM CHLORIDE: 9 INJECTION, SOLUTION INTRAVENOUS at 08:04

## 2025-04-25 RX ADMIN — HYDROMORPHONE HYDROCHLORIDE 1 MG: 2 INJECTION, SOLUTION INTRAMUSCULAR; INTRAVENOUS; SUBCUTANEOUS at 08:04

## 2025-04-25 RX ADMIN — SUCCINYLCHOLINE CHLORIDE 180 MG: 20 INJECTION, SOLUTION INTRAMUSCULAR; INTRAVENOUS at 08:04

## 2025-04-25 RX ADMIN — ROPIVACAINE HYDROCHLORIDE 30 ML: 5 INJECTION, SOLUTION EPIDURAL; INFILTRATION; PERINEURAL at 07:04

## 2025-04-25 RX ADMIN — DEXMEDETOMIDINE HYDROCHLORIDE 10 MCG: 100 INJECTION, SOLUTION INTRAVENOUS at 09:04

## 2025-04-25 RX ADMIN — FENTANYL CITRATE 100 MCG: 50 INJECTION, SOLUTION INTRAMUSCULAR; INTRAVENOUS at 07:04

## 2025-04-25 RX ADMIN — SODIUM CHLORIDE, SODIUM GLUCONATE, SODIUM ACETATE, POTASSIUM CHLORIDE AND MAGNESIUM CHLORIDE: 526; 502; 368; 37; 30 INJECTION, SOLUTION INTRAVENOUS at 09:04

## 2025-04-25 RX ADMIN — ONDANSETRON 4 MG: 2 INJECTION INTRAMUSCULAR; INTRAVENOUS at 10:04

## 2025-04-25 RX ADMIN — ROCURONIUM BROMIDE 80 MG: 10 SOLUTION INTRAVENOUS at 08:04

## 2025-04-25 RX ADMIN — MIDAZOLAM HYDROCHLORIDE 2 MG: 1 INJECTION, SOLUTION INTRAMUSCULAR; INTRAVENOUS at 07:04

## 2025-04-25 NOTE — ANESTHESIA PROCEDURE NOTES
Peripheral Block    Patient location during procedure: pre-op   Block not for primary anesthetic.  Reason for block: at surgeon's request and post-op pain management   Post-op Pain Location: L Hand & Wrist   Start time: 4/25/2025 7:35 AM  Timeout: 4/25/2025 7:29 AM   End time: 4/25/2025 7:42 AM    Staffing  Authorizing Provider: Shady Gómez MD  Performing Provider: Shady Gómez MD    Staffing  Performed by: Shady Gómez MD  Authorized by: Shady Gómez MD    Preanesthetic Checklist  Completed: patient identified, IV checked, site marked, risks and benefits discussed, surgical consent, monitors and equipment checked, pre-op evaluation and timeout performed  Peripheral Block  Patient position: supine  Prep: ChloraPrep  Patient monitoring: heart rate, cardiac monitor, continuous pulse ox, continuous capnometry and frequent blood pressure checks  Block type: infraclavicular  Laterality: left  Injection technique: single shot  Needle  Needle type: Stimuplex   Needle gauge: 22 G  Needle length: 3.5 in  Needle localization: anatomical landmarks, ultrasound guidance and nerve stimulator  Needle insertion depth: 3 cm   -ultrasound image captured on disc.  Assessment  Injection assessment: negative aspiration, negative parasthesia and local visualized surrounding nerve  Paresthesia pain: none  Heart rate change: no  Slow fractionated injection: yes  Pain Tolerance: comfortable throughout block and no complaints  Medications:    Medications: ropivacaine (NAROPIN) injection 0.5% - Perineural   30 mL - 4/25/2025 7:40:00 AM    Additional Notes  VSS.  [facility] RN monitoring vitals throughout procedure.  Patient tolerated procedure well.

## 2025-04-25 NOTE — OR NURSING
"Chief complaint:  Emesis (Reports ETOH last night and c/o n/v, ABD pain, and generalized body "cramping". Diaphoretic in triage. )      Source of information:  Patient, old chart    HPI:  Johnny Bergeron is a 18 y.o. male presenting with complaint of nausea vomiting epigastric abdominal pain started earlier this morning.  He does report that he drank alcohol heavily last night, has had similar albeit milder symptoms after alcohol use in the past, has previously been prescribed Zofran.  Currently visiting from out of town.  Reported some small streaks of bright red blood after having several episodes of emesis.  No large clots.  No blood or melena per rectum.  He is complaining of feeling lightheaded.  Muscle cramps.  No fevers.  No history of gallbladder, liver, pancreatic issues.  No prior surgeries.    ROS: As per HPI    Review of patient's allergies indicates:  No Known Allergies    No current facility-administered medications on file prior to encounter.     No current outpatient medications on file prior to encounter.       PMH:  As per HPI and below:  No past medical history on file.  No past surgical history on file.      Physical Exam:    Vitals:    11/09/24 2102   BP: 124/65   Pulse: 84   Resp:    Temp:        General:  Diaphoretic.  Uncomfortable appearing, but not in acute distress. Well developed. Well nourished.  Eyes: PERRL. EOM intact. no photophobia, no nystagmus  Conjunctivae - no pallor or icterus.   ENT: HEAD: Normal - atraumatic. Normal external ears. Normal nose.  No facial asymmetry. Mucous membranes - dry.  Neck: Neck supple. no meningismus. No cervical lymphadenopathy.  No thyromegaly.  No JVD.  Cardiovascular: Regular rate and rhythm. Normal S1 and S2. No murmur. No gallop. No rub.  2+ peripheral pulses  GI: Soft.  Epigastric tenderness without rebound or guarding.  Nondistended. Normal BS.  Musculoskeletal:  Normal weight-bearing and gait No deformities. Normal ROM x4.   Integument: No " Left  infraclavicular nerve block from 2061-4738 by Dr Gómez. Pt on monitor, tolerated well, VS stable   acute skin rashes. No clubbing or cyanosis  Neurologic: No gross neurological deficits.   Psychiatric: Awake, alert.  Oriented x3.  Normal speech and mentation.        Labs Reviewed   CBC W/ AUTO DIFFERENTIAL - Abnormal       Result Value    WBC 28.87 (*)     RBC 5.41      Hemoglobin 16.1      Hematocrit 48.1      MCV 89      MCH 29.8      MCHC 33.5      RDW 13.6      Platelets 410      MPV 11.0      Immature Granulocytes 1.0 (*)     Gran # (ANC) 26.1 (*)     Immature Grans (Abs) 0.30 (*)     Lymph # 1.0      Mono # 1.4 (*)     Eos # 0.0      Baso # 0.10      nRBC 0      Gran % 90.4 (*)     Lymph % 3.5 (*)     Mono % 4.8      Eosinophil % 0.0      Basophil % 0.3      Platelet Estimate Appears normal      Differential Method Automated      Narrative:     Release to patient->Immediate   COMPREHENSIVE METABOLIC PANEL - Abnormal    Sodium 141      Potassium 4.9      Chloride 103      CO2 16 (*)     Glucose 112 (*)     BUN 17      Creatinine 1.3      Calcium 10.9 (*)     Total Protein 9.3 (*)     Albumin 5.8 (*)     Total Bilirubin 0.9      Alkaline Phosphatase 79      AST 53 (*)     ALT 36      eGFR >60      Anion Gap 22 (*)     Narrative:     Release to patient->Immediate   HEPATITIS C ANTIBODY    Hepatitis C Ab Negative      Narrative:     Release to patient->Immediate   HIV 1 / 2 ANTIBODY    HIV 1/2 Ag/Ab Negative      Narrative:     Release to patient->Immediate   LIPASE   LIPASE    Lipase 9      Narrative:     Release to patient->Immediate   URINALYSIS, REFLEX TO URINE CULTURE       Medications   electrolytes-dextrose (Pedialyte) oral solution 400 mL (400 mLs Oral Given 11/9/24 2035)   ondansetron injection 4 mg (4 mg Intravenous Given 11/9/24 1902)   sodium chloride 0.9% bolus 1,000 mL 1,000 mL (1,000 mLs Intravenous New Bag 11/9/24 1916)   famotidine (PF) injection 20 mg (20 mg Intravenous Given 11/9/24 1903)     Medical Decision Making  Differential diagnosis includes pancreatitis, hepatitis, gastroenteritis,  dehydration    Amount and/or Complexity of Data Reviewed  Labs: ordered. Decision-making details documented in ED Course.    Risk  Prescription drug management.  Decision regarding hospitalization.  Diagnosis or treatment significantly limited by social determinants of health.          MDM:    18 y.o. male with nausea vomiting, scant amount of hemoptysis, in the setting of heavy alcohol use yesterday.  Given history of similar events in the past strongly encouraged decreasing alcohol intake.  Of note while the nurses were trying to obtain initial IV, blood draw the patient became more lightheaded and had an apparent vasovagal syncopal episode.  Brief.  Prompt return to normal mental status.  He was given 1 L of normal saline in addition to antiemetics, Pepcid.  Upon reassessment he is now much more comfortable appearing.  No longer diaphoretic.  Given the current critical shortage of IV fluids we continued further hydration with water and Pedialyte.  The patient tolerated this and crackers.  Is feeling better.  Laboratory studies show no evidence of biliary, pancreatic, hepatic disease.  Normal electrolytes and renal function.  He does have a significant leukocytosis, 29,000 with left shift I suspect this is due to an acute stress reaction as patient's clinical picture does not suggest an infectious etiology.  Is the patient was advised of this recommended follow up with primary care upon return to [place] for rechecked, repeat CBC.  Provided with copy of labs to assisted follow-up.  Prescription for omeprazole, Zofran provided.  Encouraged continued hydration at home.  Stable for discharge    Medications   electrolytes-dextrose (Pedialyte) oral solution 400 mL (400 mLs Oral Given 11/9/24 2035)   ondansetron injection 4 mg (4 mg Intravenous Given 11/9/24 1902)   sodium chloride 0.9% bolus 1,000 mL 1,000 mL (1,000 mLs Intravenous New Bag 11/9/24 1916)   famotidine (PF) injection 20 mg (20 mg Intravenous Given 11/9/24  1903)       ASSESSMENT:   1. Nausea and vomiting, unspecified vomiting type    2. Gastritis, presence of bleeding unspecified, unspecified chronicity, unspecified gastritis type    3. Leukocytosis, unspecified type             Zaid Angel II, MD  11/09/24 8884
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
## 2025-04-25 NOTE — DISCHARGE SUMMARY
[facility] - Periop Services  Discharge Note  Short Stay    Procedure(s) (LRB):  ORIF, FRACTURE, RADIUS, DISTAL (Left)  PINNING, FINGER, PERCUTANEOUS (Left)      OUTCOME: Patient tolerated treatment/procedure well without complication and is now ready for discharge.    DISPOSITION: Home or Self Care    FINAL DIAGNOSIS:  Left distal radius fracture and fifth proximal phalanx fracture    FOLLOWUP: In clinic    DISCHARGE INSTRUCTIONS:    Discharge Procedure Orders   Diet general     Activity as tolerated     Keep surgical extremity elevated     Ice to affected area     Lifting restrictions   Order Comments: No lifting, pushing, pulling with operative extremity     No driving, operating heavy equipment or signing legal documents while taking pain medication.     Other restrictions (specify):   Order Comments: Okay to wean sling as tolerated.     Leave dressing on - Keep it clean, dry, and intact until clinic visit     Call MD for:  temperature >100.4     Call MD for:  persistent nausea and vomiting     Call MD for:  severe uncontrolled pain     Call MD for:  difficulty breathing, headache or visual disturbances     Call MD for:  redness, tenderness, or signs of infection (pain, swelling, redness, odor or green/yellow discharge around incision site)     Call MD for:  hives     Call MD for:  persistent dizziness or light-headedness     Call MD for:  extreme fatigue     Shower on day dressing removed (No bath)        TIME SPENT ON DISCHARGE: 5 minutes

## 2025-04-25 NOTE — ANESTHESIA PROCEDURE NOTES
Intubation    Date/Time: 4/25/2025 8:53 AM    Performed by: Lesley Del Castillo CRNA  Authorized by: Shady Gómez MD    Intubation:     Induction:  Intravenous    Intubated:  Postinduction    Mask Ventilation:  Easy mask    Attempts:  1    Attempted By:  CRNA    Method of Intubation:  Video laryngoscopy    Blade:  Ambrosio 3    Laryngeal View Grade: Grade I - full view of cords      Difficult Airway Encountered?: No      Complications:  None    Airway Device:  Oral endotracheal tube    Airway Device Size:  7.0    Style/Cuff Inflation:  Cuffed (inflated to minimal occlusive pressure)    Inflation Amount (mL):  6    Tube secured:  21    Secured at:  The teeth    Placement Verified By:  Capnometry    Complicating Factors:  Obesity, short neck and oropharyngeal edema or fat    Findings Post-Intubation:  BS equal bilateral and atraumatic/condition of teeth unchanged

## 2025-04-25 NOTE — TRANSFER OF CARE
"Anesthesia Transfer of Care Note    Patient: Mackenzie Church    Procedure(s) Performed: Procedure(s) (LRB):  ORIF, FRACTURE, RADIUS, DISTAL (Left)  PINNING, FINGER, PERCUTANEOUS (Left)    Patient location: PACU    Anesthesia Type: general    Transport from OR: Transported from OR on room air with adequate spontaneous ventilation    Post pain: adequate analgesia    Post assessment: no apparent anesthetic complications    Post vital signs: stable    Level of consciousness: awake, alert and oriented    Nausea/Vomiting: no nausea/vomiting    Complications: none    Transfer of care protocol was followed      Last vitals: Visit Vitals  BP (!) 141/95 (BP Location: Right arm, Patient Position: Lying)   Pulse 104   Temp 35.6 °C (96 °F) (Tympanic)   Resp (!) 21   Ht 5' 3" (1.6 m)   Wt (!) 149.2 kg (328 lb 14.8 oz)   LMP 04/07/2025 (Approximate)   SpO2 97%   Breastfeeding No   BMI 58.27 kg/m²     "
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
## 2025-04-25 NOTE — ANESTHESIA POSTPROCEDURE EVALUATION
Anesthesia Post Evaluation    Patient: Mackenzie Church    Procedure(s) Performed: Procedure(s) (LRB):  ORIF, FRACTURE, RADIUS, DISTAL (Left)  PINNING, FINGER, PERCUTANEOUS (Left)    Final Anesthesia Type: general      Patient location during evaluation: PACU  Patient participation: Yes- Able to Participate  Level of consciousness: awake and alert and oriented  Post-procedure vital signs: reviewed and stable  Pain management: adequate  Airway patency: patent  JOSE mitigation strategies: Use of major conduction anesthesia (spinal/epidural) or peripheral nerve block  PONV status at discharge: No PONV  Anesthetic complications: no      Cardiovascular status: hemodynamically stable, hypertensive, blood pressure returned to baseline and tachycardic  Respiratory status: unassisted, spontaneous ventilation and room air  Hydration status: euvolemic  Follow-up not needed.              Vitals Value Taken Time   /111 04/25/25 11:46   Temp 97.6 04/25/25 14:10   Pulse 83 04/25/25 11:49   Resp 18 04/25/25 11:30   SpO2 79 % 04/25/25 11:49   Vitals shown include unfiled device data.      Event Time   Out of Recovery 11:23:00         Pain/Em Score: Pain Rating Prior to Med Admin: 10 (4/25/2025  7:29 AM)  Em Score: 10 (4/25/2025 12:22 PM)  Modified Em Score: 20 (4/25/2025 12:22 PM)

## 2025-04-28 ENCOUNTER — TELEPHONE (OUTPATIENT)
Dept: SURGERY | Facility: HOSPITAL | Age: 35
End: 2025-04-28
Payer: MEDICAID

## 2025-04-28 VITALS
HEIGHT: 63 IN | BODY MASS INDEX: 51.91 KG/M2 | RESPIRATION RATE: 18 BRPM | DIASTOLIC BLOOD PRESSURE: 90 MMHG | OXYGEN SATURATION: 94 % | TEMPERATURE: 96 F | WEIGHT: 293 LBS | HEART RATE: 89 BPM | SYSTOLIC BLOOD PRESSURE: 159 MMHG

## 2025-05-01 ENCOUNTER — TELEPHONE (OUTPATIENT)
Dept: ORTHOPEDICS | Facility: CLINIC | Age: 35
End: 2025-05-01
Payer: MEDICAID

## 2025-05-01 NOTE — TELEPHONE ENCOUNTER
Pt lvm asking for a return call. Wants to know if she can come in and get her arm rewrapped because it is getting pretty dirty, and wanted to call in her pain meds before the weekend because she knows we don't refill on Friday.     Spoke with patient, let her know med request was being sent to PA. She will call me back with a time that she can come in today or tomorrow so we can re-wrap her splint. 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

## 2025-05-02 ENCOUNTER — CLINICAL SUPPORT (OUTPATIENT)
Dept: ORTHOPEDICS | Facility: CLINIC | Age: 35
End: 2025-05-02
Payer: MEDICAID

## 2025-05-02 VITALS
SYSTOLIC BLOOD PRESSURE: 132 MMHG | HEART RATE: 90 BPM | DIASTOLIC BLOOD PRESSURE: 76 MMHG | HEIGHT: 63 IN | BODY MASS INDEX: 51.91 KG/M2 | WEIGHT: 293 LBS

## 2025-05-02 DIAGNOSIS — S62.102A CLOSED FRACTURE OF LEFT WRIST, INITIAL ENCOUNTER: ICD-10-CM

## 2025-05-02 DIAGNOSIS — G89.18 POST-OP PAIN: ICD-10-CM

## 2025-05-02 DIAGNOSIS — Z87.81 S/P ORIF (OPEN REDUCTION INTERNAL FIXATION) FRACTURE: Primary | ICD-10-CM

## 2025-05-02 DIAGNOSIS — Z98.890 S/P ORIF (OPEN REDUCTION INTERNAL FIXATION) FRACTURE: Primary | ICD-10-CM

## 2025-05-02 RX ORDER — HYDROCODONE BITARTRATE AND ACETAMINOPHEN 10; 325 MG/1; MG/1
1 TABLET ORAL EVERY 8 HOURS PRN
Qty: 21 TABLET | Refills: 0 | Status: SHIPPED | OUTPATIENT
Start: 2025-05-02

## 2025-05-02 NOTE — PROGRESS NOTES
Patient came in to have her splint rewrapped because it got dirty. Only had to re-wrap the ace bandage, gauze was still clean and intact with no drainage seeping through. I gave patient and extra ace wrap and let her know if that one got dirty again she could replace it with the new one and wash the old one. Patient voiced understanding. Patient also requested pain medication refill as well. I will send a refill request to our PA.

## 2025-05-12 ENCOUNTER — HOSPITAL ENCOUNTER (OUTPATIENT)
Dept: RADIOLOGY | Facility: CLINIC | Age: 35
Discharge: HOME | End: 2025-05-12
Payer: MEDICAID

## 2025-05-12 ENCOUNTER — OFFICE VISIT (OUTPATIENT)
Dept: ORTHOPEDICS | Facility: CLINIC | Age: 35
End: 2025-05-12
Payer: MEDICAID

## 2025-05-12 VITALS
WEIGHT: 293 LBS | HEIGHT: 63 IN | HEART RATE: 98 BPM | DIASTOLIC BLOOD PRESSURE: 74 MMHG | SYSTOLIC BLOOD PRESSURE: 132 MMHG | BODY MASS INDEX: 51.91 KG/M2

## 2025-05-12 DIAGNOSIS — Z87.81 S/P ORIF (OPEN REDUCTION INTERNAL FIXATION) FRACTURE: ICD-10-CM

## 2025-05-12 DIAGNOSIS — Z87.81 S/P ORIF (OPEN REDUCTION INTERNAL FIXATION) FRACTURE: Primary | ICD-10-CM

## 2025-05-12 DIAGNOSIS — Z98.890 S/P ORIF (OPEN REDUCTION INTERNAL FIXATION) FRACTURE: ICD-10-CM

## 2025-05-12 DIAGNOSIS — Z98.890 S/P ORIF (OPEN REDUCTION INTERNAL FIXATION) FRACTURE: Primary | ICD-10-CM

## 2025-05-12 PROCEDURE — 73110 X-RAY EXAM OF WRIST: CPT | Mod: LT,,,

## 2025-05-12 PROCEDURE — 3078F DIAST BP <80 MM HG: CPT | Mod: CPTII,,,

## 2025-05-12 PROCEDURE — 4010F ACE/ARB THERAPY RXD/TAKEN: CPT | Mod: CPTII,,,

## 2025-05-12 PROCEDURE — 73130 X-RAY EXAM OF HAND: CPT | Mod: LT,,,

## 2025-05-12 PROCEDURE — 3075F SYST BP GE 130 - 139MM HG: CPT | Mod: CPTII,,,

## 2025-05-12 PROCEDURE — 1159F MED LIST DOCD IN RCRD: CPT | Mod: CPTII,,,

## 2025-05-12 PROCEDURE — 1160F RVW MEDS BY RX/DR IN RCRD: CPT | Mod: CPTII,,,

## 2025-05-12 PROCEDURE — 99024 POSTOP FOLLOW-UP VISIT: CPT | Mod: ,,,

## 2025-05-12 RX ORDER — HYDROCODONE BITARTRATE AND ACETAMINOPHEN 10; 325 MG/1; MG/1
1 TABLET ORAL EVERY 8 HOURS PRN
Qty: 21 TABLET | Refills: 0 | Status: SHIPPED | OUTPATIENT
Start: 2025-05-12 | End: 2025-05-19

## 2025-05-12 NOTE — LETTER
May 12, 2025    Mackenzie Church  [address] [facility] [address]          [facility]  [address] [place] [address], [address]  [facility] [address]  Phone: [phone]  Fax: [phone] May 12, 2025     Patient: Mackenzie Church   YOB: 1990   Date of Visit: 5/12/2025       To Whom It May Concern:    It is my medical opinion that Mackenzie Church should remain out of work until 05/28/25. Patient will be re-evaluated by me at that time.    If you have any questions or concerns, please don't hesitate to call.    Sincerely,        Jodi Higuera PA-C

## 2025-05-12 NOTE — PROGRESS NOTES
Orthopedic Clinic    Chief Complaint: Post Op Visit    Consulting Physician: No ref. provider found    S/P: Open reduction internal fixation left distal radius fracture and closed reduction with percutaneous pinning of left small finger proximal phalanx fracture on 5/12/2025    History of Present Illness: Mackenzie Church is a 34 y.o. female is status post open reduction internal fixation distal radius fracture and percutaneous pinning of left small finger proximal phalanx fracture. Patient is doing well.  Pain is controlled on medication.  No numbness or tingling to the fingertips.  No fevers sweats or chills.     Past Medical History:   Diagnosis Date    Amenorrhea     Anemia     Asthma     Carpal tunnel syndrome     Distal radius fracture, left     Dyspareunia     Endometriosis, unspecified     Fibroid     Fibromyalgia     General anesthetics causing adverse effect in therapeutic use     wakes up and restless    Hiatal hernia     HTN (hypertension)     Infertility, female     Legal blindness     Migraine     Neuropathy     PCOS (polycystic ovarian syndrome)     Raynaud disease     Sciatica     Sleep apnea         Past Surgical History:   Procedure Laterality Date    CARPAL TUNNEL RELEASE Right 2/20/2025    Procedure: RELEASE, CARPAL TUNNEL;  Surgeon: Shravan River MD;  Location: [facility];  Service: Orthopedics;  Laterality: Right;    OPEN REDUCTION AND INTERNAL FIXATION (ORIF) OF FRACTURE OF DISTAL RADIUS Left 4/25/2025    Procedure: ORIF, FRACTURE, RADIUS, DISTAL;  Surgeon: David Nicole MD;  Location: [facility] OR;  Service: Orthopedics;  Laterality: Left;  Biomet    PERCUTANEOUS PINNING OF FINGER Left 4/25/2025    Procedure: PINNING, FINGER, PERCUTANEOUS;  Surgeon: David Nicole MD;  Location: [facility] OR;  Service: Orthopedics;  Laterality: Left;    REATTACHMENT OF FINGER Right     second and third digits    TONSILLECTOMY, ADENOIDECTOMY      ULNAR TUNNEL RELEASE Right 2/20/2025    Procedure: RELEASE,  CUBITAL TUNNEL;  Surgeon: Shravan River MD;  Location: [facility];  Service: Orthopedics;  Laterality: Right;        Social History     Tobacco Use    Smoking status: Every Day     Current packs/day: 0.50     Average packs/day: 0.5 packs/day for 20.0 years (10.0 ttl pk-yrs)     Types: Cigarettes    Smokeless tobacco: Never    Tobacco comments:     Working on quitting, went from 2 packs daily to 1/2 pack daily   Substance and Sexual Activity    Alcohol use: Not Currently     Comment: socially    Drug use: Yes     Types: Hydrocodone    Sexual activity: Not Currently     Partners: Male     Birth control/protection: Condom        Current Outpatient Medications   Medication Instructions    albuterol (PROVENTIL) 2.5 mg, Every 6 hours PRN    cholecalciferol, vitamin D3, 1,250 mcg (50,000 unit) capsule Take by mouth.    hydrALAZINE (APRESOLINE) 25 mg, Every 8 hours PRN    hydroCHLOROthiazide (HYDRODIURIL) 25 mg, Daily    HYDROcodone-acetaminophen (NORCO)  mg per tablet 1 tablet, Oral, Every 8 hours PRN    ibuprofen (ADVIL,MOTRIN) 800 mg, Oral, Every 6 hours PRN    levocetirizine (XYZAL) 5 mg, Daily    lisinopriL 10 mg, Daily    magnesium oxide (MAG-OX) 400 mg, Oral    metoclopramide HCl (REGLAN) 10 mg, Oral, 3 times daily    multivitamin (THERAGRAN) per tablet 1 tablet, Daily    oxybutynin (DITROPAN) 5 mg, Daily    RED BEET ROOT-SOUR CHERRY EXT ORAL Take by mouth.    TAZTIA  mg, Daily    tiZANidine (ZANAFLEX) 4 mg, Nightly    traZODone (DESYREL) 50 mg, Nightly    UBRELVY 100 mg, Oral, 2 times daily PRN, If symptoms persist or return, may repeat dose after 2 hours. Maximum: 200 mg per 24 hours    valACYclovir (VALTREX) 1,000 mg, 2 times daily PRN       Review of patient's allergies indicates:   Allergen Reactions    Robaxin [methocarbamol] Itching, Nausea And Vomiting and Other (See Comments)     confusion        Patient Care Team:  Sue Cantu FNP as PCP - General (Family Medicine)     Review of  "Systems:    Constitution:   Denies chills, fever, and sweats.  HENT:   Denies headaches or blurry vision.  Cardiovascular:   Denies chest pain or irregular heart beat.  Respiratory:   Denies cough or shortness of breath.  Gastrointestinal:  Denies abdominal pain, nausea, or vomiting.  Musculoskeletal:   Denies muscle cramps.  Neurological:   Denies dizziness or focal weakness.  Psychiatric/Behavior: Normal mental status.  Hematology/Lymph:  Denies bleeding problem or easy bruising/bleeding.  Skin:    Denies rash or suspicious lesions.    Physical Examination:    Vital Signs:    Vitals:    05/12/25 1028   BP: 132/74   Pulse: 98   Weight: (!) 149.3 kg (329 lb 2.4 oz)   Height: 5' 3" (1.6 m)   Body mass index is 58.31 kg/m².    Constitution:   Well-developed, well nourished patient in no acute distress.  Neurological:   Alert and oriented x 3 and cooperative to examination.     Psychiatric/Behavior: Normal mental status.  Respiratory:   No shortness of breath. Non-labored breathing.  Eyes:    Extraoccular muscles intact.    Musculoskeletal Examination:   left Upper Extremity: Surgical incisions are healing with sutures in place. No evidence of infection.  EPL and FPL are intact.  Wrist and digits are stiff from immobilization.  Sensation to light touch intact in median, ulnar, and radial distribution.  The hand is warm and well-perfused. K-wires in place with xeroform wrapped around pin sites.    Imaging:   X-ray of the left wrist shows distal radius fracture treated with volar plate and screw construct no evidence of loosening or failure. K-wires in place in small finger proximal phalanx. No signs of loosening or failure.     Assessment:    ICD-10-CM ICD-9-CM   1. S/P ORIF (open reduction internal fixation) fracture  Z98.890 V45.89    Z87.81 V15.51        Plan:  This is looking good.  Sutures will be removed today.  Patient can be transitioned to a ulnar gutter Velcro brace.  Follow up in 2 weeks for k-wire removal " and she will then begin occupational hand therapy for digit and wrist range of motion.  No pushing pulling lifting greater than 2 lb.    Follow Up: 2 weeks    X-Ray at Next Visit: left wrist

## 2025-05-13 ENCOUNTER — PATIENT MESSAGE (OUTPATIENT)
Dept: ORTHOPEDICS | Facility: CLINIC | Age: 35
End: 2025-05-13

## 2025-05-13 ENCOUNTER — HOSPITAL ENCOUNTER (OUTPATIENT)
Dept: RADIOLOGY | Facility: CLINIC | Age: 35
Discharge: HOME | End: 2025-05-13
Payer: MEDICAID

## 2025-05-13 ENCOUNTER — OFFICE VISIT (OUTPATIENT)
Dept: ORTHOPEDICS | Facility: CLINIC | Age: 35
End: 2025-05-13
Payer: MEDICAID

## 2025-05-13 VITALS
BODY MASS INDEX: 51.91 KG/M2 | WEIGHT: 293 LBS | DIASTOLIC BLOOD PRESSURE: 91 MMHG | HEIGHT: 63 IN | SYSTOLIC BLOOD PRESSURE: 144 MMHG | HEART RATE: 91 BPM

## 2025-05-13 DIAGNOSIS — M25.511 RIGHT SHOULDER PAIN, UNSPECIFIED CHRONICITY: ICD-10-CM

## 2025-05-13 DIAGNOSIS — M25.511 RIGHT SHOULDER PAIN, UNSPECIFIED CHRONICITY: Primary | ICD-10-CM

## 2025-05-13 PROCEDURE — 73030 X-RAY EXAM OF SHOULDER: CPT | Mod: RT,,,

## 2025-05-13 NOTE — TELEPHONE ENCOUNTER
Patient sent message this morning. She is concerned her pin is backing out. Please see attached picture and advise.

## 2025-05-13 NOTE — PROGRESS NOTES
Orthopedic Clinic    Chief Complaint: Post Op Visit    Consulting Physician: No ref. provider found    S/P: Open reduction internal fixation left distal radius fracture and closed reduction with percutaneous pinning of left small finger proximal phalanx fracture on 5/12/2025    History of Present Illness: Mackenzie Church is a 34 y.o. female is status post open reduction internal fixation distal radius fracture and percutaneous pinning of left small finger proximal phalanx fracture. Patient was seen yesterday for her post op visit and was transitioned into a velcro ulnar gutter brace. However, the brace is rubbing against her k-wires. She is here today to be placed into a splint and to get assessed for her right shoulder pain. She states her pain began following her accident and has progressed over time. She reports a decrease in ROM.     Past Medical History:   Diagnosis Date    Amenorrhea     Anemia     Asthma     Carpal tunnel syndrome     Distal radius fracture, left     Dyspareunia     Endometriosis, unspecified     Fibroid     Fibromyalgia     General anesthetics causing adverse effect in therapeutic use     wakes up and restless    Hiatal hernia     HTN (hypertension)     Infertility, female     Legal blindness     Migraine     Neuropathy     PCOS (polycystic ovarian syndrome)     Raynaud disease     Sciatica     Sleep apnea         Past Surgical History:   Procedure Laterality Date    CARPAL TUNNEL RELEASE Right 2/20/2025    Procedure: RELEASE, CARPAL TUNNEL;  Surgeon: Shravan River MD;  Location: [facility];  Service: Orthopedics;  Laterality: Right;    OPEN REDUCTION AND INTERNAL FIXATION (ORIF) OF FRACTURE OF DISTAL RADIUS Left 4/25/2025    Procedure: ORIF, FRACTURE, RADIUS, DISTAL;  Surgeon: David Nicole MD;  Location: [facility] OR;  Service: Orthopedics;  Laterality: Left;  Biomet    PERCUTANEOUS PINNING OF FINGER Left 4/25/2025    Procedure: PINNING, FINGER, PERCUTANEOUS;  Surgeon: David Nicole  MD;  Location: [facility] OR;  Service: Orthopedics;  Laterality: Left;    REATTACHMENT OF FINGER Right     second and third digits    TONSILLECTOMY, ADENOIDECTOMY      ULNAR TUNNEL RELEASE Right 2/20/2025    Procedure: RELEASE, CUBITAL TUNNEL;  Surgeon: Shravan River MD;  Location: [facility] OR;  Service: Orthopedics;  Laterality: Right;        Social History     Tobacco Use    Smoking status: Every Day     Current packs/day: 0.50     Average packs/day: 0.5 packs/day for 20.0 years (10.0 ttl pk-yrs)     Types: Cigarettes    Smokeless tobacco: Never    Tobacco comments:     Working on quitting, went from 2 packs daily to 1/2 pack daily   Substance and Sexual Activity    Alcohol use: Not Currently     Comment: socially    Drug use: Yes     Types: Hydrocodone    Sexual activity: Not Currently     Partners: Male     Birth control/protection: Condom        Current Outpatient Medications   Medication Instructions    albuterol (PROVENTIL) 2.5 mg, Every 6 hours PRN    cholecalciferol, vitamin D3, 1,250 mcg (50,000 unit) capsule Take by mouth.    hydrALAZINE (APRESOLINE) 25 mg, Every 8 hours PRN    hydroCHLOROthiazide (HYDRODIURIL) 25 mg, Daily    HYDROcodone-acetaminophen (NORCO)  mg per tablet 1 tablet, Oral, Every 8 hours PRN    HYDROcodone-acetaminophen (NORCO)  mg per tablet 1 tablet, Oral, Every 8 hours PRN    ibuprofen (ADVIL,MOTRIN) 800 mg, Oral, Every 6 hours PRN    levocetirizine (XYZAL) 5 mg, Daily    lisinopriL 10 mg, Daily    magnesium oxide (MAG-OX) 400 mg, Oral    metoclopramide HCl (REGLAN) 10 mg, Oral, 3 times daily    multivitamin (THERAGRAN) per tablet 1 tablet, Daily    oxybutynin (DITROPAN) 5 mg, Daily    RED BEET ROOT-SOUR CHERRY EXT ORAL Take by mouth.    TAZTIA  mg, Daily    tiZANidine (ZANAFLEX) 4 mg, Nightly    traZODone (DESYREL) 50 mg, Nightly    UBRELVY 100 mg, Oral, 2 times daily PRN, If symptoms persist or return, may repeat dose after 2 hours. Maximum: 200 mg per 24 hours     "valACYclovir (VALTREX) 1,000 mg, 2 times daily PRN       Review of patient's allergies indicates:   Allergen Reactions    Robaxin [methocarbamol] Itching, Nausea And Vomiting and Other (See Comments)     confusion        Patient Care Team:  Sue Cantu FNP as PCP - General (Family Medicine)     Review of Systems:    Constitution:   Denies chills, fever, and sweats.  HENT:   Denies headaches or blurry vision.  Cardiovascular:   Denies chest pain or irregular heart beat.  Respiratory:   Denies cough or shortness of breath.  Gastrointestinal:  Denies abdominal pain, nausea, or vomiting.  Musculoskeletal:   Denies muscle cramps.  Neurological:   Denies dizziness or focal weakness.  Psychiatric/Behavior: Normal mental status.  Hematology/Lymph:  Denies bleeding problem or easy bruising/bleeding.  Skin:    Denies rash or suspicious lesions.    Physical Examination:    Vital Signs:    Vitals:    05/13/25 1055   BP: (!) 144/91   Pulse: 91   Weight: (!) 149.3 kg (329 lb 2.4 oz)   Height: 5' 3" (1.6 m)   Body mass index is 58.31 kg/m².    Constitution:   Well-developed, well nourished patient in no acute distress.  Neurological:   Alert and oriented x 3 and cooperative to examination.     Psychiatric/Behavior: Normal mental status.  Respiratory:   No shortness of breath. Non-labored breathing.  Eyes:    Extraoccular muscles intact.    Musculoskeletal Examination:   left Upper Extremity: Surgical incisions are healing well No evidence of infection.  EPL and FPL are intact.  Wrist and digits are stiff from immobilization.  Sensation to light touch intact in median, ulnar, and radial distribution.  The hand is warm and well-perfused. K-wires in place with xeroform wrapped around pin sites.     Right upper extremity: TTP anterior aspect of shoulder. Pain with active and passive ROM.   ER 70.    Imaging:   X-ray of the left wrist shows distal radius fracture treated with volar plate and screw construct no " evidence of loosening or failure. K-wires in place in small finger proximal phalanx. No signs of loosening or failure.    X-rays of the right shoulder four views shows no acute bony abnormalities. Degenerative changes of AC joint noted.      Assessment:    ICD-10-CM ICD-9-CM   1. Right shoulder pain, unspecified chronicity  M25.511 719.41        Plan:  Patient will be transitioned back to an ulnar gutter splint to avoid irritation at k-wire site. Regarding right shoulder, informed patient that we are unable to further imaging due to k-wires in place. I will order an MRI for further imaging once her k-wires are removed. Avoid aggravating activities. Symptomatic relief as needed. Follow up in 2 weeks for k-wire removal.     Follow Up: 2 weeks    X-Ray at Next Visit: left hand

## 2025-05-19 ENCOUNTER — PATIENT MESSAGE (OUTPATIENT)
Dept: ORTHOPEDICS | Facility: CLINIC | Age: 35
End: 2025-05-19
Payer: MEDICAID

## 2025-05-19 DIAGNOSIS — Z98.890 S/P ORIF (OPEN REDUCTION INTERNAL FIXATION) FRACTURE: Primary | ICD-10-CM

## 2025-05-19 DIAGNOSIS — Z87.81 S/P ORIF (OPEN REDUCTION INTERNAL FIXATION) FRACTURE: Primary | ICD-10-CM

## 2025-05-19 RX ORDER — HYDROCODONE BITARTRATE AND ACETAMINOPHEN 5; 325 MG/1; MG/1
1 TABLET ORAL EVERY 8 HOURS PRN
Qty: 21 TABLET | Refills: 0 | Status: SHIPPED | OUTPATIENT
Start: 2025-05-19 | End: 2025-05-26

## 2025-05-27 ENCOUNTER — OFFICE VISIT (OUTPATIENT)
Dept: ORTHOPEDICS | Facility: CLINIC | Age: 35
End: 2025-05-27
Payer: MEDICAID

## 2025-05-27 ENCOUNTER — HOSPITAL ENCOUNTER (OUTPATIENT)
Dept: RADIOLOGY | Facility: CLINIC | Age: 35
Discharge: HOME | End: 2025-05-27
Payer: MEDICAID

## 2025-05-27 VITALS
SYSTOLIC BLOOD PRESSURE: 146 MMHG | HEART RATE: 76 BPM | DIASTOLIC BLOOD PRESSURE: 96 MMHG | BODY MASS INDEX: 51.91 KG/M2 | WEIGHT: 293 LBS | HEIGHT: 63 IN

## 2025-05-27 DIAGNOSIS — S62.102A CLOSED FRACTURE OF LEFT WRIST, INITIAL ENCOUNTER: ICD-10-CM

## 2025-05-27 DIAGNOSIS — Z87.81 S/P ORIF (OPEN REDUCTION INTERNAL FIXATION) FRACTURE: ICD-10-CM

## 2025-05-27 DIAGNOSIS — M25.511 ACUTE PAIN OF RIGHT SHOULDER: Primary | ICD-10-CM

## 2025-05-27 DIAGNOSIS — M25.561 CHRONIC PAIN OF RIGHT KNEE: ICD-10-CM

## 2025-05-27 DIAGNOSIS — S62.647A CLOSED NONDISPLACED FRACTURE OF PROXIMAL PHALANX OF LEFT LITTLE FINGER, INITIAL ENCOUNTER: ICD-10-CM

## 2025-05-27 DIAGNOSIS — G89.29 CHRONIC PAIN OF RIGHT KNEE: ICD-10-CM

## 2025-05-27 DIAGNOSIS — Z98.890 S/P ORIF (OPEN REDUCTION INTERNAL FIXATION) FRACTURE: ICD-10-CM

## 2025-05-27 PROCEDURE — 73562 X-RAY EXAM OF KNEE 3: CPT | Mod: RT,,,

## 2025-05-27 PROCEDURE — 73110 X-RAY EXAM OF WRIST: CPT | Mod: LT,,,

## 2025-05-27 NOTE — LETTER
May 27, 2025    Mackenzie Church  [address] [place] [address]          Orthopaedic Clinic  [address], [address]  [place] [address]  Phone: [phone]  Fax: [phone] May 27, 2025     Patient: Mackenzie Church   YOB: 1990   Date of Visit: 5/27/2025       To Whom It May Concern:    It is my medical opinion that Mackenzie Church should remain out of work until cleared by me.  Patient is currently under my care for an orthopedic injury and may not lift, carry, push, pull or bear any weight to left upper extremity until released to do so.   If you have any questions or concerns, please don't hesitate to call.    Sincerely,      MD Kalen Duarte Trisha, PA-C

## 2025-05-27 NOTE — PROGRESS NOTES
Orthopedic Clinic    Chief Complaint: Post Op Visit    Consulting Physician: No ref. provider found    S/P: Open reduction internal fixation left distal radius fracture and closed reduction with percutaneous pinning of left small finger proximal phalanx fracture on 4/25/2025    History of Present Illness: Mackenzie Church is a 34 y.o. female is status post open reduction internal fixation distal radius fracture and percutaneous pinning of left small finger proximal phalanx fracture. Patient is here today for k-wire removal. She states that she has noticed redness and drainage from her pin site. She denies fevers or chills. She was seen at her last visit for right shoulder pain. She reports a decrease in pain but it is still there. She also reports right knee pain. She states her pain is most prevalent in the back of the knee.  She denies any previous injections or PT.     Past Medical History:   Diagnosis Date    Amenorrhea     Anemia     Asthma     Carpal tunnel syndrome     Distal radius fracture, left     Dyspareunia     Endometriosis, unspecified     Fibroid     Fibromyalgia     General anesthetics causing adverse effect in therapeutic use     wakes up and restless    Hiatal hernia     HTN (hypertension)     Infertility, female     Legal blindness     Migraine     Neuropathy     PCOS (polycystic ovarian syndrome)     Raynaud disease     Sciatica     Sleep apnea         Past Surgical History:   Procedure Laterality Date    CARPAL TUNNEL RELEASE Right 2/20/2025    Procedure: RELEASE, CARPAL TUNNEL;  Surgeon: Shravan River MD;  Location: [facility];  Service: Orthopedics;  Laterality: Right;    OPEN REDUCTION AND INTERNAL FIXATION (ORIF) OF FRACTURE OF DISTAL RADIUS Left 4/25/2025    Procedure: ORIF, FRACTURE, RADIUS, DISTAL;  Surgeon: David Nicole MD;  Location: [facility];  Service: Orthopedics;  Laterality: Left;  Biomet    PERCUTANEOUS PINNING OF FINGER Left 4/25/2025    Procedure: PINNING, FINGER,  PERCUTANEOUS;  Surgeon: David Nicole MD;  Location: [facility] OR;  Service: Orthopedics;  Laterality: Left;    REATTACHMENT OF FINGER Right     second and third digits    TONSILLECTOMY, ADENOIDECTOMY      ULNAR TUNNEL RELEASE Right 2/20/2025    Procedure: RELEASE, CUBITAL TUNNEL;  Surgeon: Shravan River MD;  Location: [facility] OR;  Service: Orthopedics;  Laterality: Right;        Social History     Tobacco Use    Smoking status: Every Day     Current packs/day: 0.50     Average packs/day: 0.5 packs/day for 20.0 years (10.0 ttl pk-yrs)     Types: Cigarettes    Smokeless tobacco: Never    Tobacco comments:     Working on quitting, went from 2 packs daily to 1/2 pack daily   Substance and Sexual Activity    Alcohol use: Not Currently     Comment: socially    Drug use: Yes     Types: Hydrocodone    Sexual activity: Not Currently     Partners: Male     Birth control/protection: Condom        Current Outpatient Medications   Medication Instructions    albuterol (PROVENTIL) 2.5 mg, Every 6 hours PRN    cholecalciferol, vitamin D3, 1,250 mcg (50,000 unit) capsule Take by mouth.    hydrALAZINE (APRESOLINE) 25 mg, Every 8 hours PRN    hydroCHLOROthiazide (HYDRODIURIL) 25 mg, Daily    HYDROcodone-acetaminophen (NORCO) 5-325 mg per tablet 1 tablet, Oral, Every 8 hours PRN    ibuprofen (ADVIL,MOTRIN) 800 mg, Oral, Every 6 hours PRN    levocetirizine (XYZAL) 5 mg, Daily    lisinopriL 10 mg, Daily    magnesium oxide (MAG-OX) 400 mg, Oral    metoclopramide HCl (REGLAN) 10 mg, Oral, 3 times daily    multivitamin (THERAGRAN) per tablet 1 tablet, Daily    oxybutynin (DITROPAN) 5 mg, Daily    RED BEET ROOT-SOUR CHERRY EXT ORAL Take by mouth.    TAZTIA  mg, Daily    tiZANidine (ZANAFLEX) 4 mg, Nightly    traZODone (DESYREL) 50 mg, Nightly    UBRELVY 100 mg, Oral, 2 times daily PRN, If symptoms persist or return, may repeat dose after 2 hours. Maximum: 200 mg per 24 hours    valACYclovir (VALTREX) 1,000 mg, 2 times daily PRN  "      Review of patient's allergies indicates:   Allergen Reactions    Robaxin [methocarbamol] Itching, Nausea And Vomiting and Other (See Comments)     confusion        Patient Care Team:  Sue Cantu FNP as PCP - General (Family Medicine)     Review of Systems:    Constitution:   Denies chills, fever, and sweats.  HENT:   Denies headaches or blurry vision.  Cardiovascular:   Denies chest pain or irregular heart beat.  Respiratory:   Denies cough or shortness of breath.  Gastrointestinal:  Denies abdominal pain, nausea, or vomiting.  Musculoskeletal:   Denies muscle cramps.  Neurological:   Denies dizziness or focal weakness.  Psychiatric/Behavior: Normal mental status.  Hematology/Lymph:  Denies bleeding problem or easy bruising/bleeding.  Skin:    Denies rash or suspicious lesions.    Physical Examination:    Vital Signs:    Vitals:    05/27/25 0838 05/27/25 0927   BP: (!) 164/102 (!) 146/96   Pulse: 94 76   Weight: (!) 148.5 kg (327 lb 6.4 oz)    Height: 5' 3" (1.6 m)    Body mass index is 58 kg/m².    Constitution:   Well-developed, well nourished patient in no acute distress.  Neurological:   Alert and oriented x 3 and cooperative to examination.     Psychiatric/Behavior: Normal mental status.  Respiratory:   No shortness of breath. Non-labored breathing.  Eyes:    Extraoccular muscles intact.    Musculoskeletal Examination:   left Upper Extremity: Surgical incisions are healing well. No evidence of infection.  EPL and FPL are intact.  Wrist and digits are stiff from immobilization.  Sensation to light touch intact in median, ulnar, and radial distribution.  The hand is warm and well-perfused. K-wires in place with mild erythema surrounding pin sites. No drainage or oozing from pin sites.    Right upper extremity: TTP anterior aspect of shoulder. Pain with active and passive ROM.   ER 70.    Right lower extremity: TTP posterior medial aspect of knee. ROM 0-120. Stable with valgus and varus " stress. Ciera negative.     Imaging:   X-ray of the left wrist shows distal radius fracture treated with volar plate and screw construct no evidence of loosening or failure. K-wires in place in small finger proximal phalanx. No signs of loosening or failure.    X-rays of the right shoulder four views shows no acute bony abnormalities. Degenerative changes of AC joint noted.     X-rays of the right knee three views shows no acute bony abnormalities.      Assessment:    ICD-10-CM ICD-9-CM   1. S/P ORIF (open reduction internal fixation) fracture  Z98.890 V45.89    Z87.81 V15.51   2. Chronic pain of right knee  M25.561 719.46    G89.29 338.29   3. Acute pain of right shoulder  M25.511 719.41        Plan:  Patient will be transitioned into a left wrist brace. Ok to remove brace for hygiene purposes and gentle ROM.  Regarding right shoulder, her pain has decreased since last visit. Discussed different options, patient opted to participate in PT before trying an injection or further imaging. Offered patient an injection for her right knee but she would like to try PT first for this as well. Patient referred to occupational therapy for wrist, digit and hand ROM. She will also participate in PT for right shoulder and right knee. Avoid aggravating activities. Symptomatic relief as needed. Follow up in one month for repeat imaging and re-evaluation.     Follow Up: 1 month    X-Ray at Next Visit: left hand and wrist

## 2025-06-02 ENCOUNTER — PATIENT MESSAGE (OUTPATIENT)
Dept: ORTHOPEDICS | Facility: CLINIC | Age: 35
End: 2025-06-02
Payer: MEDICAID

## 2025-06-16 ENCOUNTER — PATIENT MESSAGE (OUTPATIENT)
Dept: ORTHOPEDICS | Facility: CLINIC | Age: 35
End: 2025-06-16
Payer: MEDICAID

## 2025-06-16 DIAGNOSIS — Z87.81 S/P ORIF (OPEN REDUCTION INTERNAL FIXATION) FRACTURE: Primary | ICD-10-CM

## 2025-06-16 DIAGNOSIS — Z98.890 S/P ORIF (OPEN REDUCTION INTERNAL FIXATION) FRACTURE: Primary | ICD-10-CM

## 2025-06-16 DIAGNOSIS — S62.647A CLOSED NONDISPLACED FRACTURE OF PROXIMAL PHALANX OF LEFT LITTLE FINGER, INITIAL ENCOUNTER: ICD-10-CM

## 2025-07-09 ENCOUNTER — PATIENT MESSAGE (OUTPATIENT)
Dept: ORTHOPEDICS | Facility: CLINIC | Age: 35
End: 2025-07-09
Payer: MEDICAID

## 2025-07-16 ENCOUNTER — HOSPITAL ENCOUNTER (OUTPATIENT)
Dept: RADIOLOGY | Facility: CLINIC | Age: 35
Discharge: HOME | End: 2025-07-16
Payer: MEDICAID

## 2025-07-16 ENCOUNTER — HOSPITAL ENCOUNTER (OUTPATIENT)
Dept: RADIOLOGY | Facility: CLINIC | Age: 35
Discharge: HOME | End: 2025-07-16
Attending: REHABILITATION UNIT
Payer: MEDICAID

## 2025-07-16 ENCOUNTER — OFFICE VISIT (OUTPATIENT)
Dept: ORTHOPEDICS | Facility: CLINIC | Age: 35
End: 2025-07-16
Payer: MEDICAID

## 2025-07-16 VITALS
BODY MASS INDEX: 51.91 KG/M2 | HEIGHT: 63 IN | SYSTOLIC BLOOD PRESSURE: 177 MMHG | DIASTOLIC BLOOD PRESSURE: 99 MMHG | WEIGHT: 293 LBS | HEART RATE: 91 BPM

## 2025-07-16 DIAGNOSIS — S62.102A CLOSED FRACTURE OF LEFT WRIST, INITIAL ENCOUNTER: Primary | ICD-10-CM

## 2025-07-16 DIAGNOSIS — Z98.890 S/P ORIF (OPEN REDUCTION INTERNAL FIXATION) FRACTURE: ICD-10-CM

## 2025-07-16 DIAGNOSIS — Z87.81 S/P ORIF (OPEN REDUCTION INTERNAL FIXATION) FRACTURE: ICD-10-CM

## 2025-07-16 DIAGNOSIS — S62.102A CLOSED FRACTURE OF LEFT WRIST, INITIAL ENCOUNTER: ICD-10-CM

## 2025-07-16 DIAGNOSIS — G89.29 CHRONIC PAIN OF RIGHT KNEE: ICD-10-CM

## 2025-07-16 DIAGNOSIS — M25.561 CHRONIC PAIN OF RIGHT KNEE: ICD-10-CM

## 2025-07-16 PROCEDURE — 3080F DIAST BP >= 90 MM HG: CPT | Mod: CPTII,,, | Performed by: REHABILITATION UNIT

## 2025-07-16 PROCEDURE — 73110 X-RAY EXAM OF WRIST: CPT | Mod: LT,,, | Performed by: REHABILITATION UNIT

## 2025-07-16 PROCEDURE — 4010F ACE/ARB THERAPY RXD/TAKEN: CPT | Mod: CPTII,,, | Performed by: REHABILITATION UNIT

## 2025-07-16 PROCEDURE — 73130 X-RAY EXAM OF HAND: CPT | Mod: LT,,,

## 2025-07-16 PROCEDURE — 20610 DRAIN/INJ JOINT/BURSA W/O US: CPT | Mod: 79,RT,,

## 2025-07-16 PROCEDURE — 1159F MED LIST DOCD IN RCRD: CPT | Mod: CPTII,,, | Performed by: REHABILITATION UNIT

## 2025-07-16 PROCEDURE — 99024 POSTOP FOLLOW-UP VISIT: CPT | Mod: ,,, | Performed by: REHABILITATION UNIT

## 2025-07-16 PROCEDURE — 3077F SYST BP >= 140 MM HG: CPT | Mod: CPTII,,, | Performed by: REHABILITATION UNIT

## 2025-07-16 RX ADMIN — LIDOCAINE HYDROCHLORIDE 2 ML: 10 INJECTION, SOLUTION INFILTRATION; PERINEURAL at 01:07

## 2025-07-16 RX ADMIN — BETAMETHASONE SODIUM PHOSPHATE AND BETAMETHASONE ACETATE 12 MG: 3; 3 INJECTION, SUSPENSION INTRA-ARTICULAR; INTRALESIONAL; INTRAMUSCULAR; SOFT TISSUE at 01:07

## 2025-08-11 RX ORDER — LIDOCAINE HYDROCHLORIDE 10 MG/ML
2 INJECTION, SOLUTION INFILTRATION; PERINEURAL
Status: DISCONTINUED | OUTPATIENT
Start: 2025-07-16 | End: 2025-08-11 | Stop reason: HOSPADM

## 2025-08-11 RX ORDER — BETAMETHASONE SODIUM PHOSPHATE AND BETAMETHASONE ACETATE 3; 3 MG/ML; MG/ML
12 INJECTION, SUSPENSION INTRA-ARTICULAR; INTRALESIONAL; INTRAMUSCULAR; SOFT TISSUE
Status: DISCONTINUED | OUTPATIENT
Start: 2025-07-16 | End: 2025-08-11 | Stop reason: HOSPADM

## 2025-09-02 ENCOUNTER — TELEPHONE (OUTPATIENT)
Dept: NEUROLOGY | Facility: CLINIC | Age: 35
End: 2025-09-02
Payer: MEDICAID

## (undated) DEVICE — BLADE SURG STAINLESS STEEL #15

## (undated) DEVICE — SUT ETHILON 3-0 FS-1 30

## (undated) DEVICE — SUT 3-0 MONOCRYL PLUS PS-2

## (undated) DEVICE — BANDAGE VELCLOSE ELAS 3INX5YD

## (undated) DEVICE — WIRE KIRSCHNER 1.6MM 6IN SS
Type: IMPLANTABLE DEVICE | Site: RADIUS | Status: NON-FUNCTIONAL
Removed: 2025-04-25

## (undated) DEVICE — CUFF ATS 2 PORT SNGL BLDR 18IN

## (undated) DEVICE — PADDING CAST 4IN DELTA ROLL

## (undated) DEVICE — DRAPE HAND STERILE

## (undated) DEVICE — SUT VICRYL RAPIDE 4-0 18 P

## (undated) DEVICE — DRAPE C-ARM MINI POLY 54X84IN

## (undated) DEVICE — KIT SURGICAL TURNOVER

## (undated) DEVICE — GLOVE SIGNATURE MICRO LTX 7

## (undated) DEVICE — COVER MAYO STND XL 30X57IN

## (undated) DEVICE — ALCOHOL 70% ISO RUBBING 16OZ

## (undated) DEVICE — SOL NACL IRR 1000ML BTL

## (undated) DEVICE — DRAPE FULL SHEET 70X100IN

## (undated) DEVICE — GLOVE SENSICARE PI ORTHO LT 7

## (undated) DEVICE — CORD BIPOLAR 12 FOOT

## (undated) DEVICE — ELECTRODE PATIENT RETURN DISP

## (undated) DEVICE — APPLICATOR CHLORAPREP ORN 26ML

## (undated) DEVICE — GLOVE SIGNATURE MICRO LTX 7.5

## (undated) DEVICE — BANDAGE VELCLOSE ELAS 2INX5YD

## (undated) DEVICE — Device

## (undated) DEVICE — SPONGE GAUZE 16PLY 4X4

## (undated) DEVICE — GLOVE SENSICARE PI GRN 6.5

## (undated) DEVICE — GOWN POLY REINF BRTH SLV XL

## (undated) DEVICE — TOURNIQUET SB QC DP 24X4IN

## (undated) DEVICE — GLOVE SENSICARE PI GRN 7.5

## (undated) DEVICE — DRAPE STERI U-SHAPED 47X51IN

## (undated) DEVICE — NDL SAFETY 21G X 1 IN ECLIPSE

## (undated) DEVICE — MANIFOLD 4 PORT

## (undated) DEVICE — TAPE SILK 3IN

## (undated) DEVICE — SUT MONOCRYL 2-0 S UND

## (undated) DEVICE — KIT BASIC ORTHO UNIVERSITY

## (undated) DEVICE — PENCIL ROCKER SWITCH 10FT CORD

## (undated) DEVICE — PAD ABDOMINAL STERILE 8X10IN

## (undated) DEVICE — GLOVE SENSICARE PI GRN 7

## (undated) DEVICE — GAUZE SPONGE BULKEE 6X6.75IN

## (undated) DEVICE — SPONGE GAUZE 4X4 12 PLY STRL

## (undated) DEVICE — BANDAGE ESMARK ELASTIC ST 4X9

## (undated) DEVICE — GLOVE SIGNATURE MICRO LTX 8

## (undated) DEVICE — DRESSING GAUZE XEROFORM 5X9

## (undated) DEVICE — GLOVE SENSICARE PI SURG 6.5

## (undated) DEVICE — SCREW ALPS LP N LOK 2.7X16MM
Type: IMPLANTABLE DEVICE | Site: RADIUS | Status: NON-FUNCTIONAL
Removed: 2025-04-25

## (undated) DEVICE — COVER FULLGUARD SHOE HIGH-TOP

## (undated) DEVICE — SUT MONOCRYL PLUS UD 3-0 27

## (undated) DEVICE — PADDING WYTEX UNDRCST 4INX4YD

## (undated) DEVICE — POSITIONER HEAD SUPINE PINK

## (undated) DEVICE — CAST PLSTR SPLINT X-FAST 3X15

## (undated) DEVICE — SYR 10CC LUER LOCK

## (undated) DEVICE — BANDAGE GAUZE COT STRL 4.5X4.1

## (undated) DEVICE — COVER EQUIPMENT 36X25

## (undated) DEVICE — BANDAGE SWIFTWRAP ELAS 4INX5YD

## (undated) DEVICE — GLOVE SIGNATURE MICRO LTX 6.5

## (undated) DEVICE — GOWN POLY REINF X-LONG 2XL